# Patient Record
Sex: FEMALE | Race: WHITE | NOT HISPANIC OR LATINO | Employment: UNEMPLOYED | RURAL
[De-identification: names, ages, dates, MRNs, and addresses within clinical notes are randomized per-mention and may not be internally consistent; named-entity substitution may affect disease eponyms.]

---

## 2020-07-13 ENCOUNTER — HISTORICAL (OUTPATIENT)
Dept: ADMINISTRATIVE | Facility: HOSPITAL | Age: 60
End: 2020-07-13

## 2020-07-14 LAB — SARS-COV+SARS-COV-2 AG RESP QL IA.RAPID: NEGATIVE

## 2021-01-08 ENCOUNTER — HISTORICAL (OUTPATIENT)
Dept: ADMINISTRATIVE | Facility: HOSPITAL | Age: 61
End: 2021-01-08

## 2021-04-29 ENCOUNTER — OFFICE VISIT (OUTPATIENT)
Dept: FAMILY MEDICINE | Facility: CLINIC | Age: 61
End: 2021-04-29

## 2021-04-29 VITALS
BODY MASS INDEX: 22.5 KG/M2 | WEIGHT: 140 LBS | DIASTOLIC BLOOD PRESSURE: 76 MMHG | SYSTOLIC BLOOD PRESSURE: 120 MMHG | HEART RATE: 71 BPM | OXYGEN SATURATION: 97 % | RESPIRATION RATE: 20 BRPM | HEIGHT: 66 IN

## 2021-04-29 DIAGNOSIS — E78.5 HYPERLIPIDEMIA, UNSPECIFIED HYPERLIPIDEMIA TYPE: Primary | ICD-10-CM

## 2021-04-29 DIAGNOSIS — I10 HYPERTENSION, UNSPECIFIED TYPE: ICD-10-CM

## 2021-04-29 PROCEDURE — 99212 OFFICE O/P EST SF 10 MIN: CPT | Mod: ,,, | Performed by: FAMILY MEDICINE

## 2021-04-29 PROCEDURE — 99212 PR OFFICE/OUTPT VISIT, EST, LEVL II, 10-19 MIN: ICD-10-PCS | Mod: ,,, | Performed by: FAMILY MEDICINE

## 2021-04-29 RX ORDER — LOVASTATIN 20 MG/1
20 TABLET ORAL NIGHTLY
Qty: 90 TABLET | Refills: 3 | Status: SHIPPED | OUTPATIENT
Start: 2021-04-29 | End: 2022-05-13 | Stop reason: SDUPTHER

## 2021-04-29 RX ORDER — LISINOPRIL 10 MG/1
10 TABLET ORAL EVERY MORNING
COMMUNITY
Start: 2021-04-07 | End: 2021-04-29 | Stop reason: SDUPTHER

## 2021-04-29 RX ORDER — LISINOPRIL 10 MG/1
10 TABLET ORAL EVERY MORNING
Qty: 90 TABLET | Refills: 1 | Status: SHIPPED | OUTPATIENT
Start: 2021-04-29 | End: 2021-11-01 | Stop reason: SDUPTHER

## 2021-05-10 ENCOUNTER — TELEPHONE (OUTPATIENT)
Dept: FAMILY MEDICINE | Facility: CLINIC | Age: 61
End: 2021-05-10

## 2021-07-29 ENCOUNTER — OFFICE VISIT (OUTPATIENT)
Dept: FAMILY MEDICINE | Facility: CLINIC | Age: 61
End: 2021-07-29

## 2021-07-29 VITALS
RESPIRATION RATE: 18 BRPM | OXYGEN SATURATION: 97 % | SYSTOLIC BLOOD PRESSURE: 122 MMHG | HEART RATE: 71 BPM | WEIGHT: 140 LBS | TEMPERATURE: 98 F | HEIGHT: 66 IN | DIASTOLIC BLOOD PRESSURE: 78 MMHG | BODY MASS INDEX: 22.5 KG/M2

## 2021-07-29 DIAGNOSIS — E78.5 HYPERLIPIDEMIA, UNSPECIFIED HYPERLIPIDEMIA TYPE: ICD-10-CM

## 2021-07-29 DIAGNOSIS — Z12.31 SCREENING MAMMOGRAM FOR HIGH-RISK PATIENT: ICD-10-CM

## 2021-07-29 DIAGNOSIS — I10 HYPERTENSION, UNSPECIFIED TYPE: Primary | ICD-10-CM

## 2021-07-29 PROCEDURE — 99212 OFFICE O/P EST SF 10 MIN: CPT | Mod: ,,, | Performed by: FAMILY MEDICINE

## 2021-07-29 PROCEDURE — 99212 PR OFFICE/OUTPT VISIT, EST, LEVL II, 10-19 MIN: ICD-10-PCS | Mod: ,,, | Performed by: FAMILY MEDICINE

## 2021-11-01 DIAGNOSIS — I10 HYPERTENSION, UNSPECIFIED TYPE: ICD-10-CM

## 2021-11-01 RX ORDER — LISINOPRIL 10 MG/1
10 TABLET ORAL EVERY MORNING
Qty: 90 TABLET | Refills: 1 | Status: SHIPPED | OUTPATIENT
Start: 2021-11-01 | End: 2022-05-11

## 2021-11-08 ENCOUNTER — PATIENT MESSAGE (OUTPATIENT)
Dept: FAMILY MEDICINE | Facility: CLINIC | Age: 61
End: 2021-11-08

## 2021-11-11 ENCOUNTER — OFFICE VISIT (OUTPATIENT)
Dept: FAMILY MEDICINE | Facility: CLINIC | Age: 61
End: 2021-11-11

## 2021-11-11 VITALS
WEIGHT: 140 LBS | SYSTOLIC BLOOD PRESSURE: 123 MMHG | TEMPERATURE: 98 F | OXYGEN SATURATION: 95 % | HEIGHT: 66 IN | BODY MASS INDEX: 22.5 KG/M2 | RESPIRATION RATE: 20 BRPM | HEART RATE: 83 BPM | DIASTOLIC BLOOD PRESSURE: 87 MMHG

## 2021-11-11 DIAGNOSIS — R05.1 ACUTE COUGH: ICD-10-CM

## 2021-11-11 DIAGNOSIS — Z11.52 ENCOUNTER FOR SCREENING FOR COVID-19: Primary | ICD-10-CM

## 2021-11-11 DIAGNOSIS — J22 LOWER RESPIRATORY INFECTION: ICD-10-CM

## 2021-11-11 LAB
CTP QC/QA: YES
FLUAV AG NPH QL: NEGATIVE
FLUBV AG NPH QL: NEGATIVE
SARS-COV-2 AG RESP QL IA.RAPID: NEGATIVE
SARS-COV-2 RNA RESP QL NAA+PROBE: NEGATIVE

## 2021-11-11 PROCEDURE — 87428 POCT SARS-COV2 (COVID) WITH FLU ANTIGEN: ICD-10-PCS | Mod: QW,,, | Performed by: NURSE PRACTITIONER

## 2021-11-11 PROCEDURE — 87635 SARS-COV-2 (COVID-19) QUALITATIVE PCR: ICD-10-PCS | Mod: ,,, | Performed by: CLINICAL MEDICAL LABORATORY

## 2021-11-11 PROCEDURE — 99204 OFFICE O/P NEW MOD 45 MIN: CPT | Mod: 25,,, | Performed by: NURSE PRACTITIONER

## 2021-11-11 PROCEDURE — 87635 SARS-COV-2 COVID-19 AMP PRB: CPT | Mod: ,,, | Performed by: CLINICAL MEDICAL LABORATORY

## 2021-11-11 PROCEDURE — 96372 THER/PROPH/DIAG INJ SC/IM: CPT | Mod: ,,, | Performed by: NURSE PRACTITIONER

## 2021-11-11 PROCEDURE — 96372 PR INJECTION,THERAP/PROPH/DIAG2ST, IM OR SUBCUT: ICD-10-PCS | Mod: ,,, | Performed by: NURSE PRACTITIONER

## 2021-11-11 PROCEDURE — 99204 PR OFFICE/OUTPT VISIT, NEW, LEVL IV, 45-59 MIN: ICD-10-PCS | Mod: 25,,, | Performed by: NURSE PRACTITIONER

## 2021-11-11 PROCEDURE — 87428 SARSCOV & INF VIR A&B AG IA: CPT | Mod: QW,,, | Performed by: NURSE PRACTITIONER

## 2021-11-11 RX ORDER — ALBUTEROL SULFATE 90 UG/1
2 AEROSOL, METERED RESPIRATORY (INHALATION) EVERY 6 HOURS PRN
Qty: 18 G | Refills: 0 | Status: SHIPPED | OUTPATIENT
Start: 2021-11-11 | End: 2021-12-06

## 2021-11-11 RX ORDER — AZITHROMYCIN 500 MG/1
500 TABLET, FILM COATED ORAL DAILY
Qty: 5 TABLET | Refills: 0 | Status: SHIPPED | OUTPATIENT
Start: 2021-11-11 | End: 2021-11-16

## 2021-11-11 RX ORDER — DEXAMETHASONE SODIUM PHOSPHATE 4 MG/ML
8 INJECTION, SOLUTION INTRA-ARTICULAR; INTRALESIONAL; INTRAMUSCULAR; INTRAVENOUS; SOFT TISSUE ONCE
Status: COMPLETED | OUTPATIENT
Start: 2021-11-11 | End: 2021-11-11

## 2021-11-11 RX ORDER — CEFTRIAXONE 1 G/1
1 INJECTION, POWDER, FOR SOLUTION INTRAMUSCULAR; INTRAVENOUS
Status: COMPLETED | OUTPATIENT
Start: 2021-11-11 | End: 2021-11-11

## 2021-11-11 RX ORDER — DEXCHLORPHENIRAMINE MALEATE, DEXTROMETHORPHAN HBR, PHENYLEPHRINE HCL 1; 10; 5 MG/5ML; MG/5ML; MG/5ML
10 SYRUP ORAL EVERY 6 HOURS PRN
Qty: 120 ML | Refills: 1 | Status: SHIPPED | OUTPATIENT
Start: 2021-11-11 | End: 2022-02-16

## 2021-11-11 RX ADMIN — CEFTRIAXONE 1 G: 1 INJECTION, POWDER, FOR SOLUTION INTRAMUSCULAR; INTRAVENOUS at 10:11

## 2021-11-11 RX ADMIN — DEXAMETHASONE SODIUM PHOSPHATE 8 MG: 4 INJECTION, SOLUTION INTRA-ARTICULAR; INTRALESIONAL; INTRAMUSCULAR; INTRAVENOUS; SOFT TISSUE at 10:11

## 2022-02-16 ENCOUNTER — OFFICE VISIT (OUTPATIENT)
Dept: FAMILY MEDICINE | Facility: CLINIC | Age: 62
End: 2022-02-16

## 2022-02-16 VITALS
HEART RATE: 77 BPM | WEIGHT: 143 LBS | OXYGEN SATURATION: 99 % | SYSTOLIC BLOOD PRESSURE: 140 MMHG | DIASTOLIC BLOOD PRESSURE: 84 MMHG | RESPIRATION RATE: 18 BRPM | TEMPERATURE: 98 F | BODY MASS INDEX: 22.98 KG/M2 | HEIGHT: 66 IN

## 2022-02-16 DIAGNOSIS — Z12.11 SCREENING FOR COLON CANCER: ICD-10-CM

## 2022-02-16 DIAGNOSIS — R74.9 ABNORMAL LEVEL OF SERUM ENZYME: ICD-10-CM

## 2022-02-16 DIAGNOSIS — Z23 NEED FOR PROPHYLACTIC VACCINATION AND INOCULATION AGAINST INFLUENZA: Primary | ICD-10-CM

## 2022-02-16 PROCEDURE — 99212 PR OFFICE/OUTPT VISIT, EST, LEVL II, 10-19 MIN: ICD-10-PCS | Mod: 25,,, | Performed by: FAMILY MEDICINE

## 2022-02-16 PROCEDURE — 90471 IMMUNIZATION ADMIN: CPT | Mod: ,,, | Performed by: FAMILY MEDICINE

## 2022-02-16 PROCEDURE — 84075 ALKALINE PHOSPHATASE, ISOENZYMES: ICD-10-PCS | Mod: 90,,, | Performed by: CLINICAL MEDICAL LABORATORY

## 2022-02-16 PROCEDURE — 84075 ASSAY ALKALINE PHOSPHATASE: CPT | Mod: 90,,, | Performed by: CLINICAL MEDICAL LABORATORY

## 2022-02-16 PROCEDURE — 90686 IIV4 VACC NO PRSV 0.5 ML IM: CPT | Mod: ,,, | Performed by: FAMILY MEDICINE

## 2022-02-16 PROCEDURE — 90471 FLU VACCINE (QUAD) GREATER THAN OR EQUAL TO 3YO PRESERVATIVE FREE IM: ICD-10-PCS | Mod: ,,, | Performed by: FAMILY MEDICINE

## 2022-02-16 PROCEDURE — 84080 ASSAY ALKALINE PHOSPHATASES: CPT | Mod: 90,,, | Performed by: CLINICAL MEDICAL LABORATORY

## 2022-02-16 PROCEDURE — 99212 OFFICE O/P EST SF 10 MIN: CPT | Mod: 25,,, | Performed by: FAMILY MEDICINE

## 2022-02-16 PROCEDURE — 84080 ALKALINE PHOSPHATASE, ISOENZYMES: ICD-10-PCS | Mod: 90,,, | Performed by: CLINICAL MEDICAL LABORATORY

## 2022-02-16 PROCEDURE — 90686 FLU VACCINE (QUAD) GREATER THAN OR EQUAL TO 3YO PRESERVATIVE FREE IM: ICD-10-PCS | Mod: ,,, | Performed by: FAMILY MEDICINE

## 2022-02-16 NOTE — PROGRESS NOTES
Esperanza Wakefield is a 61 y.o. female seen today for follow-up on her hypertension and hyperlipidemia.  Patient's LDL was mildly elevated and we discussed improving her diet by decreasing intake of fried and fatty foods.  She also had a very mild elevation of her alkaline phosphatase and we will order fractionated alkaline phosphatase panel today.  She has had no chest pain or shortness of breath and is up-to-date on her immunizations.  Her mammogram is up-to-date.    History reviewed. No pertinent past medical history.  History reviewed. No pertinent family history.  Current Outpatient Medications on File Prior to Visit   Medication Sig Dispense Refill    albuterol (PROVENTIL/VENTOLIN HFA) 90 mcg/actuation inhaler INHALE 2 PUFFS INTO THE LUNGS EVERY 6 HOURS AS NEEDED FOR WHEEZING 18 g 0    lisinopriL 10 MG tablet Take 1 tablet (10 mg total) by mouth every morning. 90 tablet 1    lovastatin (MEVACOR) 20 MG tablet Take 1 tablet (20 mg total) by mouth every evening. 90 tablet 3    [DISCONTINUED] dexchlorphen-phenylephrine-DM (POLYTUSSIN DM) 1-5-10 mg/5 mL Syrp Take 10 mLs by mouth every 6 (six) hours as needed (cough). 120 mL 1     No current facility-administered medications on file prior to visit.     Immunization History   Administered Date(s) Administered    COVID-19, MRNA, LN-S, PF (Pfizer) (Purple Cap) 04/01/2021    COVID-19, vector-nr, rS-Ad26, PF (Nolberto) 04/12/2021       ROS     Vitals:    02/16/22 0836   BP: (!) 140/84   Pulse: 77   Resp: 18   Temp: 98.2 °F (36.8 °C)       Physical Exam     Assessment and Plan  Need for prophylactic vaccination and inoculation against influenza  -     Influenza - Quadrivalent *Preferred* (6 months+) (PF)    Abnormal level of serum enzyme  -     Alkaline Phosphatase, Isoenzymes; Future; Expected date: 02/16/2022    Screening for colon cancer  -     Cancel: Cologuard Screening (Multitarget Stool DNA); Future; Expected date: 02/16/2022  -     Cologuard Screening (Multitarget  Stool DNA); Future; Expected date: 02/23/2022            Return to clinic in 3 months for follow-up lab work or as needed once her blood work is in.    Health Maintenance Topics with due status: Not Due       Topic Last Completion Date    Lipid Panel 02/15/2022         Answers for HPI/ROS submitted by the patient on 2/8/2022  Chronicity: chronic  Onset: more than 1 year ago  Progression since onset: unchanged  Condition status: controlled  anxiety: No  peripheral edema: No  sweats: No  Agents associated with hypertension: no associated agents  CAD risks: family history  Compliance problems: no compliance problems  Past treatments: lifestyle changes  Improvement on treatment: significant

## 2022-02-21 LAB
ALP BONE CFR SERPL: 32.3 % (ref 19.1–67.7)
ALP BONE SERPL-CCNC: 39.1 IU/L (ref 12.1–42.7)
ALP INTEST CFR SERPL: 0 % (ref 0–20.6)
ALP INTEST SERPL-CCNC: 0 IU/L (ref 0–11)
ALP LIVER 1 CFR SERPL: 65.1 % (ref 27.8–76.3)
ALP LIVER 1 SERPL-CCNC: 78.8 IU/L (ref 16.2–70.2)
ALP LIVER 2 CFR SERPL: 2.6 % (ref 0–8)
ALP LIVER 2 SERPL-CCNC: 3.1 IU/L (ref 0–5.8)
ALP PLAC SERPL QL: ABNORMAL
ALP SERPL-CCNC: 121 U/L (ref 35–104)

## 2022-03-02 ENCOUNTER — TELEPHONE (OUTPATIENT)
Dept: FAMILY MEDICINE | Facility: CLINIC | Age: 62
End: 2022-03-02

## 2022-03-02 DIAGNOSIS — R74.8 ELEVATED LIVER ENZYMES: Primary | ICD-10-CM

## 2022-03-02 NOTE — TELEPHONE ENCOUNTER
Pt notified and verbalized understanding. Liver US in for scheduling. Pt reports she has not completed cologuard at this time.

## 2022-05-13 ENCOUNTER — OFFICE VISIT (OUTPATIENT)
Dept: FAMILY MEDICINE | Facility: CLINIC | Age: 62
End: 2022-05-13

## 2022-05-13 VITALS
DIASTOLIC BLOOD PRESSURE: 72 MMHG | BODY MASS INDEX: 22.18 KG/M2 | OXYGEN SATURATION: 95 % | TEMPERATURE: 98 F | HEART RATE: 70 BPM | HEIGHT: 66 IN | WEIGHT: 138 LBS | SYSTOLIC BLOOD PRESSURE: 134 MMHG | RESPIRATION RATE: 18 BRPM

## 2022-05-13 DIAGNOSIS — E78.49 OTHER HYPERLIPIDEMIA: ICD-10-CM

## 2022-05-13 DIAGNOSIS — Z23 NEED FOR TDAP VACCINATION: ICD-10-CM

## 2022-05-13 DIAGNOSIS — I10 ESSENTIAL HYPERTENSION, BENIGN: Primary | ICD-10-CM

## 2022-05-13 PROCEDURE — 90715 TDAP VACCINE GREATER THAN OR EQUAL TO 7YO IM: ICD-10-PCS | Mod: ,,, | Performed by: NURSE PRACTITIONER

## 2022-05-13 PROCEDURE — 90715 TDAP VACCINE 7 YRS/> IM: CPT | Mod: ,,, | Performed by: NURSE PRACTITIONER

## 2022-05-13 PROCEDURE — 90471 TDAP VACCINE GREATER THAN OR EQUAL TO 7YO IM: ICD-10-PCS | Mod: ,,, | Performed by: NURSE PRACTITIONER

## 2022-05-13 PROCEDURE — 99214 PR OFFICE/OUTPT VISIT, EST, LEVL IV, 30-39 MIN: ICD-10-PCS | Mod: 25,,, | Performed by: NURSE PRACTITIONER

## 2022-05-13 PROCEDURE — 90471 IMMUNIZATION ADMIN: CPT | Mod: ,,, | Performed by: NURSE PRACTITIONER

## 2022-05-13 PROCEDURE — 99214 OFFICE O/P EST MOD 30 MIN: CPT | Mod: 25,,, | Performed by: NURSE PRACTITIONER

## 2022-05-13 RX ORDER — LOVASTATIN 20 MG/1
20 TABLET ORAL NIGHTLY
Qty: 90 TABLET | Refills: 1 | Status: SHIPPED | OUTPATIENT
Start: 2022-05-13 | End: 2022-12-07 | Stop reason: SDUPTHER

## 2022-05-13 RX ORDER — LISINOPRIL 10 MG/1
10 TABLET ORAL EVERY MORNING
Qty: 90 TABLET | Refills: 1 | Status: SHIPPED | OUTPATIENT
Start: 2022-05-13 | End: 2022-11-30 | Stop reason: SDUPTHER

## 2022-05-13 NOTE — PROGRESS NOTES
[facility] - FAMILY MEDICINE         PATIENT NAME: Esperanza Wakefield   : 1960    AGE: 61 y.o. DATE: 2022    MRN: [id number]        Reason for Visit / Chief Complaint:  Follow-up (Routine 3 month and lab review, reports she is nonfasting ) and Medication Refill     Subjective:     HPI:  61 yr old WF presents to the office for 3m f/u  Denies any complaints at this time  Reports takes medication as prescribed  Reviewed lab results from lab drawn yesterday - labs WNL     Review of Systems:    Pertinent items are above noted in HPI.  A comprehensive review of systems was negative  Review of patient's allergies indicates:  No Known Allergies     Med List:  Current Outpatient Medications on File Prior to Visit   Medication Sig Dispense Refill    albuterol (PROVENTIL/VENTOLIN HFA) 90 mcg/actuation inhaler INHALE 2 PUFFS INTO THE LUNGS EVERY 6 HOURS AS NEEDED FOR WHEEZING 18 g 0    lisinopriL 10 MG tablet TAKE 1 TABLET(10 MG) BY MOUTH EVERY MORNING 90 tablet 1    lovastatin (MEVACOR) 20 MG tablet Take 1 tablet (20 mg total) by mouth every evening. 90 tablet 3     No current facility-administered medications on file prior to visit.       Medical/Social/Family History:  Past Medical History:   Diagnosis Date    Hyperlipidemia     Hypertension     SOB (shortness of breath)     Wheezing       Social History     Tobacco Use   Smoking Status Current Every Day Smoker    Packs/day: 0.50    Years: 40.00    Pack years: 20.00    Types: Cigarettes   Smokeless Tobacco Never Used      Social History     Substance and Sexual Activity   Alcohol Use Not Currently       Family History   Problem Relation Age of Onset    Lung cancer Mother     Hypertension Mother     Hypertension Father     Diabetes Father     Hypertension Sister     Diabetes Sister     Hypertension Brother     Diabetes Brother       Past Surgical History:   Procedure Laterality Date    HERNIA REPAIR      SINUS SURGERY  "     TUBAL LIGATION      TUMOR REMOVAL          Objective:      Vitals:    05/13/22 0818   BP: 134/72   BP Location: Left arm   Patient Position: Sitting   BP Method: Large (Manual)   Pulse: 70   Resp: 18   Temp: 97.8 °F (36.6 °C)   TempSrc: Oral   SpO2: 95%   Weight: 62.6 kg (138 lb)   Height: 5' 6" (1.676 m)     Body mass index is 22.27 kg/m².     Physical Exam:    General: well developed, well nourished    Head: normocephalic, atraumatic    Respiratory: unlabored respirations, no intercostal retractions or accessory muscle use, clear to auscultation without rales or wheezes    Cardiovascular: normal rate and regular rhythm, S1 and S2 normal, no murmurs noted    Abdomen: soft, nontender    Musculoskeletal: negative; full range of motion, no tenderness, palpable spasm or pain on motion    Neurological: normal without focal findings and mental status, speech normal, alert and oriented x3    Skin: Skin color, texture, turgor normal. No rashes or lesions    Psych: no auditory or visual hallucinations, no anxiety, no depression/worrying alot       Assessment:          ICD-10-CM ICD-9-CM   1. Need for Tdap vaccination  Z23 V06.1   2. Hyperlipidemia, unspecified hyperlipidemia type  E78.5 272.4   3. Hypertension, unspecified type  I10 401.9        Plan:       Need for Tdap vaccination    Hyperlipidemia, unspecified hyperlipidemia type    Hypertension, unspecified type        Current Outpatient Medications:     albuterol (PROVENTIL/VENTOLIN HFA) 90 mcg/actuation inhaler, INHALE 2 PUFFS INTO THE LUNGS EVERY 6 HOURS AS NEEDED FOR WHEEZING, Disp: 18 g, Rfl: 0    lisinopriL 10 MG tablet, TAKE 1 TABLET(10 MG) BY MOUTH EVERY MORNING, Disp: 90 tablet, Rfl: 1    lovastatin (MEVACOR) 20 MG tablet, Take 1 tablet (20 mg total) by mouth every evening., Disp: 90 tablet, Rfl: 3      New & refilled meds:  Requested Prescriptions     Pending Prescriptions Disp Refills    lovastatin (MEVACOR) 20 MG tablet 90 tablet 3     Sig: Take 1 " tablet (20 mg total) by mouth every evening.    lisinopriL 10 MG tablet 90 tablet 1     Sig: Take 1 tablet (10 mg total) by mouth every morning.     Treatment Recommendations:    Orders and follow up as documented in patient record.    Return to clinic in 6 months for htn, hld and as needed.    Signature: CHELSEY Mcmillan

## 2022-09-26 ENCOUNTER — TELEPHONE (OUTPATIENT)
Dept: FAMILY MEDICINE | Facility: CLINIC | Age: 62
End: 2022-09-26

## 2022-11-14 ENCOUNTER — TELEPHONE (OUTPATIENT)
Dept: FAMILY MEDICINE | Facility: CLINIC | Age: 62
End: 2022-11-14

## 2022-11-14 NOTE — TELEPHONE ENCOUNTER
Patient sent request through portal for refills, Estelle refused. I called the patient and notified her Dang Kingsley is no longer here and she would need to establish care for refills. Patient stated she would look at her calendar and get back with us on appointment.

## 2022-11-30 DIAGNOSIS — I10 ESSENTIAL HYPERTENSION, BENIGN: ICD-10-CM

## 2022-11-30 RX ORDER — LISINOPRIL 10 MG/1
10 TABLET ORAL EVERY MORNING
Qty: 30 TABLET | Refills: 0 | Status: SHIPPED | OUTPATIENT
Start: 2022-11-30 | End: 2022-12-07 | Stop reason: SDUPTHER

## 2022-12-07 ENCOUNTER — OFFICE VISIT (OUTPATIENT)
Dept: FAMILY MEDICINE | Facility: CLINIC | Age: 62
End: 2022-12-07

## 2022-12-07 VITALS
HEIGHT: 66 IN | HEART RATE: 76 BPM | DIASTOLIC BLOOD PRESSURE: 78 MMHG | BODY MASS INDEX: 22.18 KG/M2 | SYSTOLIC BLOOD PRESSURE: 135 MMHG | OXYGEN SATURATION: 99 % | WEIGHT: 138 LBS | TEMPERATURE: 98 F | RESPIRATION RATE: 18 BRPM

## 2022-12-07 DIAGNOSIS — I10 ESSENTIAL HYPERTENSION, BENIGN: ICD-10-CM

## 2022-12-07 DIAGNOSIS — E78.49 OTHER HYPERLIPIDEMIA: ICD-10-CM

## 2022-12-07 PROCEDURE — 99214 PR OFFICE/OUTPT VISIT, EST, LEVL IV, 30-39 MIN: ICD-10-PCS | Mod: ,,, | Performed by: FAMILY MEDICINE

## 2022-12-07 PROCEDURE — 99214 OFFICE O/P EST MOD 30 MIN: CPT | Mod: ,,, | Performed by: FAMILY MEDICINE

## 2022-12-07 RX ORDER — LOVASTATIN 20 MG/1
20 TABLET ORAL NIGHTLY
Qty: 90 TABLET | Refills: 1 | Status: SHIPPED | OUTPATIENT
Start: 2022-12-07 | End: 2022-12-19 | Stop reason: SDUPTHER

## 2022-12-07 RX ORDER — LISINOPRIL 10 MG/1
10 TABLET ORAL EVERY MORNING
Qty: 90 TABLET | Refills: 1 | Status: SHIPPED | OUTPATIENT
Start: 2022-12-07 | End: 2023-06-07 | Stop reason: SDUPTHER

## 2022-12-07 NOTE — PROGRESS NOTES
Esperanza Wakefield is a 62 y.o. female seen today for follow-up on her hyperlipidemia and hypertension.  Her blood pressures have been well controlled but her lipids are still not to goal with an LDL cholesterol of 85.  She admits to not following a low-fat diet as well as not taking her cholesterol medication nightly.  She plans on taking her medication as prescribed and decreasing her intake of fatty foods.  We will plan on rechecking lipids in approximately 6 months.  For now patient did defer the flu vaccination and colon cancer screening due to lack of insurance.  Patient may get her flu vaccination for free at a local pharmacy.      Past Medical History:   Diagnosis Date    Hyperlipidemia     Hypertension     SOB (shortness of breath)     Wheezing      Family History   Problem Relation Age of Onset    Lung cancer Mother     Hypertension Mother     Hypertension Father     Diabetes Father     Hypertension Sister     Diabetes Sister     Hypertension Brother     Diabetes Brother      Current Outpatient Medications on File Prior to Visit   Medication Sig Dispense Refill    albuterol (PROVENTIL/VENTOLIN HFA) 90 mcg/actuation inhaler INHALE 2 PUFFS INTO THE LUNGS EVERY 6 HOURS AS NEEDED FOR WHEEZING 18 g 0    [DISCONTINUED] lisinopriL 10 MG tablet Take 1 tablet (10 mg total) by mouth every morning. 30 tablet 0    [DISCONTINUED] lovastatin (MEVACOR) 20 MG tablet Take 1 tablet (20 mg total) by mouth every evening. 90 tablet 1     No current facility-administered medications on file prior to visit.     Immunization History   Administered Date(s) Administered    COVID-19, MRNA, LN-S, PF (MODERNA FULL 0.5 ML DOSE) 04/01/2021    COVID-19, MRNA, LN-S, PF (Pfizer) (Purple Cap) 04/01/2021    COVID-19, vector-nr, rS-Ad26, PF (Nolberto) 04/12/2021    Influenza - Quadrivalent - PF *Preferred* (6 months and older) 02/16/2022    Tdap 05/13/2022       Review of Systems   Constitutional:  Negative for fever, malaise/fatigue and weight loss.    Respiratory:  Negative for shortness of breath.    Cardiovascular:  Negative for chest pain and palpitations.   Gastrointestinal:  Negative for nausea and vomiting.   Psychiatric/Behavioral:  Negative for depression.       Vitals:    12/07/22 1113   BP: 135/78   Pulse: 76   Resp: 18   Temp: 97.5 °F (36.4 °C)       Physical Exam  Vitals reviewed.   Constitutional:       Appearance: Normal appearance.   HENT:      Head: Normocephalic.   Eyes:      Extraocular Movements: Extraocular movements intact.      Conjunctiva/sclera: Conjunctivae normal.      Pupils: Pupils are equal, round, and reactive to light.   Neck:      Thyroid: No thyroid mass or thyromegaly.   Cardiovascular:      Rate and Rhythm: Normal rate and regular rhythm.      Heart sounds: Normal heart sounds. No murmur heard.    No gallop.   Pulmonary:      Effort: Pulmonary effort is normal. No respiratory distress.      Breath sounds: Normal breath sounds. No wheezing or rales.   Skin:     General: Skin is warm and dry.      Coloration: Skin is not jaundiced or pale.   Neurological:      Mental Status: She is alert.   Psychiatric:         Mood and Affect: Mood normal.         Behavior: Behavior normal.         Thought Content: Thought content normal.         Judgment: Judgment normal.        Assessment and Plan  Essential hypertension, benign  -     lisinopriL 10 MG tablet; Take 1 tablet (10 mg total) by mouth every morning.  Dispense: 90 tablet; Refill: 1  -     CBC Auto Differential; Future; Expected date: 06/07/2023  -     Comprehensive Metabolic Panel; Future; Expected date: 06/07/2023    Other hyperlipidemia  -     lovastatin (MEVACOR) 20 MG tablet; Take 1 tablet (20 mg total) by mouth every evening.  Dispense: 90 tablet; Refill: 1  -     Lipid Panel; Future; Expected date: 06/07/2023            Return to clinic in 6 months for follow-up lab work and an office visit or as needed.    Health Maintenance Topics with due status: Not Due       Topic Last  Completion Date    TETANUS VACCINE 05/13/2022    Lipid Panel 12/06/2022

## 2022-12-19 DIAGNOSIS — E78.49 OTHER HYPERLIPIDEMIA: ICD-10-CM

## 2022-12-19 RX ORDER — LOVASTATIN 20 MG/1
20 TABLET ORAL NIGHTLY
Qty: 90 TABLET | Refills: 1 | Status: SHIPPED | OUTPATIENT
Start: 2022-12-19 | End: 2023-06-07

## 2022-12-19 NOTE — TELEPHONE ENCOUNTER
----- Message from Chelsea Spencer MA sent at 12/19/2022 10:46 AM CST -----  Please send in lovastatin to [facility] on [address] for pt.  She says this was sent in for her but when she went to pick it up they had canceled it and she needs it sent back in.

## 2023-06-07 ENCOUNTER — OFFICE VISIT (OUTPATIENT)
Dept: FAMILY MEDICINE | Facility: CLINIC | Age: 63
End: 2023-06-07

## 2023-06-07 VITALS
SYSTOLIC BLOOD PRESSURE: 118 MMHG | WEIGHT: 140 LBS | HEART RATE: 82 BPM | HEIGHT: 66 IN | TEMPERATURE: 98 F | OXYGEN SATURATION: 99 % | RESPIRATION RATE: 19 BRPM | DIASTOLIC BLOOD PRESSURE: 70 MMHG | BODY MASS INDEX: 22.5 KG/M2

## 2023-06-07 DIAGNOSIS — I10 ESSENTIAL HYPERTENSION, BENIGN: ICD-10-CM

## 2023-06-07 DIAGNOSIS — E78.49 OTHER HYPERLIPIDEMIA: ICD-10-CM

## 2023-06-07 PROCEDURE — 99214 OFFICE O/P EST MOD 30 MIN: CPT | Mod: ,,, | Performed by: FAMILY MEDICINE

## 2023-06-07 PROCEDURE — 99214 PR OFFICE/OUTPT VISIT, EST, LEVL IV, 30-39 MIN: ICD-10-PCS | Mod: ,,, | Performed by: FAMILY MEDICINE

## 2023-06-07 RX ORDER — LOVASTATIN 40 MG/1
40 TABLET ORAL NIGHTLY
Qty: 90 TABLET | Refills: 2 | Status: SHIPPED | OUTPATIENT
Start: 2023-06-07 | End: 2023-12-07 | Stop reason: SDUPTHER

## 2023-06-07 RX ORDER — LOVASTATIN 20 MG/1
20 TABLET ORAL NIGHTLY
Qty: 90 TABLET | Refills: 1 | Status: CANCELLED | OUTPATIENT
Start: 2023-06-07 | End: 2023-12-04

## 2023-06-07 RX ORDER — LISINOPRIL 10 MG/1
10 TABLET ORAL EVERY MORNING
Qty: 90 TABLET | Refills: 2 | Status: SHIPPED | OUTPATIENT
Start: 2023-06-07 | End: 2023-12-07 | Stop reason: ALTCHOICE

## 2023-06-14 DIAGNOSIS — J22 LOWER RESPIRATORY INFECTION: ICD-10-CM

## 2023-06-14 RX ORDER — ALBUTEROL SULFATE 90 UG/1
2 AEROSOL, METERED RESPIRATORY (INHALATION) EVERY 6 HOURS PRN
Qty: 18 G | Refills: 3 | Status: SHIPPED | OUTPATIENT
Start: 2023-06-14 | End: 2023-12-07 | Stop reason: SDUPTHER

## 2023-06-26 ENCOUNTER — PATIENT OUTREACH (OUTPATIENT)
Dept: ADMINISTRATIVE | Facility: HOSPITAL | Age: 63
End: 2023-06-26

## 2023-06-26 NOTE — PROGRESS NOTES
Health maintenance record review for population health care gaps    06/26/2023   --Chart accessed for:cervical cancer screen  --Care Gaps addressed:cervical cancer screen  Outreach made to patient via na . (Success) (Left Message) (Unavailable)   Patient stated na  Care Everywhere updates requested and reviewed.  Media reports reviewed.  [facility] and [facility] reviewed.  Immunization Database ([employer]/MIXX) reviewed. Vaccinations uploaded:   updated with external [facility], , [facility], [facility] EVERYWHERE   Report  Requested na records from na  Next appointment na . Appointment notes updated to include: na  Health Maintenance Due   Topic Date Due    Hepatitis C Screening  Never done    Cervical Cancer Screening  Never done    Pneumococcal Vaccines (Age 0-64) (1 - PCV) Never done    Mammogram  Never done    Colorectal Cancer Screening  Never done    LDCT Lung Screen  Never done    Shingles Vaccine (1 of 2) Never done

## 2023-07-20 ENCOUNTER — PATIENT MESSAGE (OUTPATIENT)
Dept: ADMINISTRATIVE | Facility: HOSPITAL | Age: 63
End: 2023-07-20

## 2023-12-07 ENCOUNTER — OFFICE VISIT (OUTPATIENT)
Dept: FAMILY MEDICINE | Facility: CLINIC | Age: 63
End: 2023-12-07

## 2023-12-07 VITALS
HEART RATE: 70 BPM | BODY MASS INDEX: 22.85 KG/M2 | RESPIRATION RATE: 18 BRPM | DIASTOLIC BLOOD PRESSURE: 62 MMHG | TEMPERATURE: 98 F | OXYGEN SATURATION: 96 % | HEIGHT: 66 IN | WEIGHT: 142.19 LBS | SYSTOLIC BLOOD PRESSURE: 110 MMHG

## 2023-12-07 DIAGNOSIS — Z23 NEED FOR IMMUNIZATION AGAINST INFLUENZA: ICD-10-CM

## 2023-12-07 DIAGNOSIS — E78.49 OTHER HYPERLIPIDEMIA: Primary | ICD-10-CM

## 2023-12-07 DIAGNOSIS — R05.3 CHRONIC COUGH: ICD-10-CM

## 2023-12-07 PROCEDURE — 90471 IMMUNIZATION ADMIN: CPT | Mod: ,,, | Performed by: FAMILY MEDICINE

## 2023-12-07 PROCEDURE — 90686 IIV4 VACC NO PRSV 0.5 ML IM: CPT | Mod: ,,, | Performed by: FAMILY MEDICINE

## 2023-12-07 PROCEDURE — 90686 FLU VACCINE (QUAD) GREATER THAN OR EQUAL TO 3YO PRESERVATIVE FREE IM: ICD-10-PCS | Mod: ,,, | Performed by: FAMILY MEDICINE

## 2023-12-07 PROCEDURE — 99213 OFFICE O/P EST LOW 20 MIN: CPT | Mod: 25,,, | Performed by: FAMILY MEDICINE

## 2023-12-07 PROCEDURE — 99213 PR OFFICE/OUTPT VISIT, EST, LEVL III, 20-29 MIN: ICD-10-PCS | Mod: 25,,, | Performed by: FAMILY MEDICINE

## 2023-12-07 PROCEDURE — 90471 FLU VACCINE (QUAD) GREATER THAN OR EQUAL TO 3YO PRESERVATIVE FREE IM: ICD-10-PCS | Mod: ,,, | Performed by: FAMILY MEDICINE

## 2023-12-07 RX ORDER — LISINOPRIL 10 MG/1
10 TABLET ORAL EVERY MORNING
Qty: 90 TABLET | Refills: 2 | Status: CANCELLED | OUTPATIENT
Start: 2023-12-07

## 2023-12-07 RX ORDER — LOVASTATIN 40 MG/1
40 TABLET ORAL NIGHTLY
Qty: 90 TABLET | Refills: 2 | Status: SHIPPED | OUTPATIENT
Start: 2023-12-07 | End: 2024-12-06

## 2023-12-07 RX ORDER — ALBUTEROL SULFATE 90 UG/1
2 AEROSOL, METERED RESPIRATORY (INHALATION) EVERY 6 HOURS PRN
Qty: 18 G | Refills: 3 | Status: SHIPPED | OUTPATIENT
Start: 2023-12-07

## 2023-12-07 RX ORDER — ALBUTEROL SULFATE 90 UG/1
2 AEROSOL, METERED RESPIRATORY (INHALATION) EVERY 6 HOURS PRN
Qty: 18 G | Refills: 3 | Status: SHIPPED | OUTPATIENT
Start: 2023-12-07 | End: 2023-12-07 | Stop reason: CLARIF

## 2023-12-07 NOTE — PROGRESS NOTES
Esperanza Wakefield is a 63 y.o. female seen today for follow-up on her blood pressure and hyperlipidemia.  Patient's lipids were to goal and her blood pressures have been on the low side lately.  We discussed holding her lisinopril and following up with her blood pressure log for a nurse visit in approximately 1 month.  Patient denies any chest pain or shortness a breath and is active in meeting her ADLs.  Patient defers a mammogram for now due to cost.      Past Medical History:   Diagnosis Date    Hyperlipidemia     Hypertension     SOB (shortness of breath)     Wheezing      Family History   Problem Relation Age of Onset    Lung cancer Mother     Hypertension Mother     Hypertension Father     Diabetes Father     Hypertension Sister     Diabetes Sister     Hypertension Brother     Diabetes Brother      Current Outpatient Medications on File Prior to Visit   Medication Sig Dispense Refill    [DISCONTINUED] albuterol (PROVENTIL/VENTOLIN HFA) 90 mcg/actuation inhaler Inhale 2 puffs into the lungs every 6 (six) hours as needed for Wheezing. Rescue 18 g 3    [DISCONTINUED] lisinopriL 10 MG tablet Take 1 tablet (10 mg total) by mouth every morning. 90 tablet 2    [DISCONTINUED] lovastatin (MEVACOR) 40 MG tablet Take 1 tablet (40 mg total) by mouth every evening. 90 tablet 2     No current facility-administered medications on file prior to visit.     Immunization History   Administered Date(s) Administered    COVID-19, MRNA, LN-S, PF (MODERNA FULL 0.5 ML DOSE) 04/01/2021    COVID-19, MRNA, LN-S, PF (Pfizer) (Purple Cap) 04/01/2021    COVID-19, vector-nr, rS-Ad26, PF (Ibotta) 04/12/2021    Influenza - Quadrivalent - PF *Preferred* (6 months and older) 02/16/2022    Tdap 05/13/2022       Review of Systems   Constitutional:  Negative for fever, malaise/fatigue and weight loss.   Respiratory:  Negative for shortness of breath.    Cardiovascular:  Negative for chest pain and palpitations.   Gastrointestinal:  Negative for nausea  and vomiting.   Neurological:  Positive for dizziness.   Psychiatric/Behavioral:  Negative for depression.         Vitals:    12/07/23 0831   BP: 110/62   Pulse:    Resp:    Temp:        Physical Exam  Vitals reviewed.   Constitutional:       Appearance: Normal appearance.   HENT:      Head: Normocephalic.   Eyes:      Extraocular Movements: Extraocular movements intact.      Conjunctiva/sclera: Conjunctivae normal.      Pupils: Pupils are equal, round, and reactive to light.   Neck:      Thyroid: No thyroid mass or thyromegaly.   Cardiovascular:      Rate and Rhythm: Normal rate and regular rhythm.      Heart sounds: Normal heart sounds. No murmur heard.     No gallop.   Pulmonary:      Effort: Pulmonary effort is normal. No respiratory distress.      Breath sounds: Normal breath sounds. No wheezing or rales.   Skin:     General: Skin is warm and dry.      Coloration: Skin is not jaundiced or pale.   Neurological:      Mental Status: She is alert.   Psychiatric:         Mood and Affect: Mood normal.         Behavior: Behavior normal.         Thought Content: Thought content normal.         Judgment: Judgment normal.          Assessment and Plan  1. Other hyperlipidemia  -     lovastatin (MEVACOR) 40 MG tablet; Take 1 tablet (40 mg total) by mouth every evening.  Dispense: 90 tablet; Refill: 2    2. Need for immunization against influenza  -     Influenza - Quadrivalent *Preferred* (6 months+) (PF)    3. Chronic cough  -     albuterol (PROVENTIL/VENTOLIN HFA) 90 mcg/actuation inhaler; Inhale 2 puffs into the lungs every 6 (six) hours as needed for Wheezing. Rescue  Dispense: 18 g; Refill: 3    Other orders  -     Discontinue: albuterol (PROVENTIL/VENTOLIN HFA) 90 mcg/actuation inhaler; Inhale 2 puffs into the lungs every 6 (six) hours as needed for Wheezing. Rescue  Dispense: 18 g; Refill: 3             Return to clinic in 1 month for a nurse visit with blood pressure log.    Health Maintenance Topics with due  status: Not Due       Topic Last Completion Date    TETANUS VACCINE 05/13/2022    Lipid Panel 12/06/2023

## 2023-12-30 ENCOUNTER — HOSPITAL ENCOUNTER (EMERGENCY)
Facility: HOSPITAL | Age: 63
Discharge: HOME | End: 2023-12-30

## 2023-12-30 VITALS
SYSTOLIC BLOOD PRESSURE: 146 MMHG | OXYGEN SATURATION: 95 % | RESPIRATION RATE: 18 BRPM | DIASTOLIC BLOOD PRESSURE: 64 MMHG | TEMPERATURE: 98 F | BODY MASS INDEX: 23.3 KG/M2 | WEIGHT: 145 LBS | HEART RATE: 84 BPM | HEIGHT: 66 IN

## 2023-12-30 DIAGNOSIS — S42.202S CLOSED FRACTURE OF PROXIMAL END OF LEFT HUMERUS, UNSPECIFIED FRACTURE MORPHOLOGY, SEQUELA: Primary | ICD-10-CM

## 2023-12-30 PROCEDURE — 63600175 PHARM REV CODE 636 W HCPCS: Performed by: REGISTERED NURSE

## 2023-12-30 PROCEDURE — 96372 THER/PROPH/DIAG INJ SC/IM: CPT | Performed by: REGISTERED NURSE

## 2023-12-30 PROCEDURE — 99284 EMERGENCY DEPT VISIT MOD MDM: CPT | Mod: ,,, | Performed by: REGISTERED NURSE

## 2023-12-30 PROCEDURE — 99284 EMERGENCY DEPT VISIT MOD MDM: CPT

## 2023-12-30 RX ORDER — HYDROCODONE BITARTRATE AND ACETAMINOPHEN 7.5; 325 MG/1; MG/1
1 TABLET ORAL EVERY 8 HOURS PRN
Qty: 12 TABLET | Refills: 0 | Status: SHIPPED | OUTPATIENT
Start: 2023-12-30

## 2023-12-30 RX ORDER — MORPHINE SULFATE 4 MG/ML
4 INJECTION, SOLUTION INTRAMUSCULAR; INTRAVENOUS
Status: COMPLETED | OUTPATIENT
Start: 2023-12-30 | End: 2023-12-30

## 2023-12-30 RX ORDER — MORPHINE SULFATE 4 MG/ML
4 INJECTION, SOLUTION INTRAMUSCULAR; INTRAVENOUS
Status: DISCONTINUED | OUTPATIENT
Start: 2023-12-30 | End: 2023-12-30

## 2023-12-30 RX ADMIN — MORPHINE SULFATE 4 MG: 4 INJECTION, SOLUTION INTRAMUSCULAR; INTRAVENOUS at 12:12

## 2023-12-30 NOTE — ED PROVIDER NOTES
Encounter Date: 12/30/2023       History     Chief Complaint   Patient presents with    Shoulder Injury     63-year-old female presenting to the emergency room today complaining of left arm pain after falling.  Reports walking down steps and tripped and fell landing on her left side.  Left arm currently guarded with decreased range of motion tenderness to palpation.  No reported loss of consciousness, denies striking her head, no other interval      Review of patient's allergies indicates:  No Known Allergies  Past Medical History:   Diagnosis Date    Hyperlipidemia     Hypertension     SOB (shortness of breath)     Wheezing      Past Surgical History:   Procedure Laterality Date    HERNIA REPAIR      SINUS SURGERY      TUBAL LIGATION      TUMOR REMOVAL       Family History   Problem Relation Age of Onset    Lung cancer Mother     Hypertension Mother     Hypertension Father     Diabetes Father     Hypertension Sister     Diabetes Sister     Hypertension Brother     Diabetes Brother      Social History     Tobacco Use    Smoking status: Every Day     Current packs/day: 0.50     Average packs/day: 0.5 packs/day for 40.0 years (20.0 ttl pk-yrs)     Types: Cigarettes     Passive exposure: Current    Smokeless tobacco: Never   Substance Use Topics    Alcohol use: Not Currently    Drug use: Never     Review of Systems   Musculoskeletal:         Left arm pain proximal humerus   Neurological:  Negative for dizziness, syncope, light-headedness and headaches.   All other systems reviewed and are negative.      Physical Exam     Initial Vitals [12/30/23 1025]   BP Pulse Resp Temp SpO2   (!) 146/64 84 17 98.1 °F (36.7 °C) 95 %      MAP       --         Physical Exam    Vitals reviewed.  Constitutional: She appears well-developed and well-nourished.   HENT:   Head: Normocephalic and atraumatic.   Eyes: EOM are normal.   Neck:   Normal range of motion.  Cardiovascular:  Normal rate and intact distal pulses.           No murmur  heard.  Pulmonary/Chest: Breath sounds normal. No respiratory distress.   Abdominal: Abdomen is soft.   Musculoskeletal:         General: Tenderness present.      Cervical back: Normal range of motion.      Comments: Tenderness right humerus with guarding of external     Neurological: She is alert and oriented to person, place, and time. GCS score is 15. GCS eye subscore is 4. GCS verbal subscore is 5. GCS motor subscore is 6.   Skin: Skin is warm and dry. Capillary refill takes less than 2 seconds.   Psychiatric: She has a normal mood and affect.         Medical Screening Exam   See Full Note    ED Course   Procedures  Labs Reviewed - No data to display       Imaging Results              X-Ray Shoulder 2 or More Views Left (Final result)  Result time 12/30/23 10:41:58      Final result by Marcell Vargas MD (12/30/23 10:41:58)                   Impression:      Fracture involving the greater tuberosity of the humerus and extending into the humeral neck      Electronically signed by: Marcell Vargas  Date:    12/30/2023  Time:    10:41               Narrative:    EXAMINATION:  XR SHOULDER COMPLETE 2 OR MORE VIEWS LEFT    CLINICAL HISTORY:  shoulder injury;    TECHNIQUE:  Two or three views of the left shoulder were performed.    COMPARISON:  None    FINDINGS:  There is an avulsion fracture along the greater tuberosity with mild displacement.  This appears to extend inferiorly into the humeral neck as best can be appreciated on this radiograph.  No other fracture detected.                                       Medications   morphine injection 4 mg (4 mg Intramuscular Given 12/30/23 1214)     Medical Decision Making  Amount and/or Complexity of Data Reviewed  Radiology: ordered. Decision-making details documented in ED Course.    Risk  Prescription drug management.               ED Course as of 12/30/23 1747   Sat Dec 30, 2023   1129 X-Ray Shoulder 2 or More Views Left [SF]      ED Course User Index  [SF] Yasir Prince  MARBIN                           Clinical Impression:   Final diagnoses:  [S42.202S] Closed fracture of proximal end of left humerus, unspecified fracture morphology, sequela (Primary)        ED Disposition Condition    Discharge Stable          ED Prescriptions       Medication Sig Dispense Start Date End Date Auth. Provider    HYDROcodone-acetaminophen (NORCO) 7.5-325 mg per tablet Take 1 tablet by mouth every 8 (eight) hours as needed for Pain. 12 tablet 12/30/2023 -- Yasir Prince ACNP          Follow-up Information    None          Yasir Prince ACNP  12/30/23 9450

## 2024-04-19 DIAGNOSIS — R05.3 CHRONIC COUGH: ICD-10-CM

## 2024-04-19 RX ORDER — ALBUTEROL SULFATE 90 UG/1
2 AEROSOL, METERED RESPIRATORY (INHALATION) EVERY 6 HOURS PRN
Qty: 18 G | Refills: 3 | Status: SHIPPED | OUTPATIENT
Start: 2024-04-19

## 2024-04-19 NOTE — TELEPHONE ENCOUNTER
----- Message from Chelsea Spencer MA sent at 4/19/2024  8:46 AM CDT -----  Please call in albuterol inhaler to [facility] on [address] for pt.

## 2024-06-06 ENCOUNTER — OFFICE VISIT (OUTPATIENT)
Dept: FAMILY MEDICINE | Facility: CLINIC | Age: 64
End: 2024-06-06

## 2024-06-06 VITALS
WEIGHT: 137 LBS | SYSTOLIC BLOOD PRESSURE: 122 MMHG | TEMPERATURE: 98 F | OXYGEN SATURATION: 96 % | RESPIRATION RATE: 18 BRPM | HEART RATE: 88 BPM | BODY MASS INDEX: 22.02 KG/M2 | DIASTOLIC BLOOD PRESSURE: 72 MMHG | HEIGHT: 66 IN

## 2024-06-06 DIAGNOSIS — E78.49 OTHER HYPERLIPIDEMIA: Primary | ICD-10-CM

## 2024-06-06 DIAGNOSIS — J30.9 ALLERGIC RHINITIS, UNSPECIFIED SEASONALITY, UNSPECIFIED TRIGGER: ICD-10-CM

## 2024-06-06 PROCEDURE — 99214 OFFICE O/P EST MOD 30 MIN: CPT | Mod: ,,, | Performed by: FAMILY MEDICINE

## 2024-06-06 RX ORDER — LOVASTATIN 40 MG/1
40 TABLET ORAL NIGHTLY
Qty: 90 TABLET | Refills: 1 | Status: SHIPPED | OUTPATIENT
Start: 2024-06-06 | End: 2024-12-03

## 2024-06-06 RX ORDER — FLUTICASONE PROPIONATE 50 MCG
2 SPRAY, SUSPENSION (ML) NASAL DAILY
Qty: 16 G | Refills: 11 | Status: SHIPPED | OUTPATIENT
Start: 2024-06-06

## 2024-06-06 NOTE — PROGRESS NOTES
Esperanza Wakefield is a 63 y.o. female seen today for increased symptoms of nasal congestion postnasal drainage.  Patient reports she tried Zyrtec but this was too sedating the next day we discussed a trial of Flonase.  Patient's lipids were to goal when checked in December.  Patient's blood pressures have been excellent off of medication.  She denies any chest pain or shortness a breath and defers a mammogram and colon cancer screening at this time.      Past Medical History:   Diagnosis Date    Hyperlipidemia     Hypertension     SOB (shortness of breath)     Wheezing      Family History   Problem Relation Name Age of Onset    Lung cancer Mother      Hypertension Mother      Hypertension Father      Diabetes Father      Hypertension Sister      Diabetes Sister      Hypertension Brother      Diabetes Brother       Current Outpatient Medications on File Prior to Visit   Medication Sig Dispense Refill    albuterol (PROVENTIL/VENTOLIN HFA) 90 mcg/actuation inhaler Inhale 2 puffs into the lungs every 6 (six) hours as needed for Wheezing. Rescue 18 g 3    [DISCONTINUED] lovastatin (MEVACOR) 40 MG tablet Take 1 tablet (40 mg total) by mouth every evening. 90 tablet 2    HYDROcodone-acetaminophen (NORCO) 7.5-325 mg per tablet Take 1 tablet by mouth every 8 (eight) hours as needed for Pain. (Patient not taking: Reported on 6/6/2024) 12 tablet 0     No current facility-administered medications on file prior to visit.     Immunization History   Administered Date(s) Administered    COVID-19, MRNA, LN-S, PF (MODERNA FULL 0.5 ML DOSE) 04/01/2021    COVID-19, MRNA, LN-S, PF (Pfizer) (Purple Cap) 04/01/2021    COVID-19, vector-nr, rS-Ad26, PF (Nolberto) 04/12/2021    Influenza - Quadrivalent - PF *Preferred* (6 months and older) 02/16/2022, 12/07/2023    Tdap 05/13/2022       Review of Systems   Constitutional:  Negative for fever, malaise/fatigue and weight loss.   HENT:  Positive for congestion.    Respiratory:  Negative for shortness  of breath.    Cardiovascular:  Negative for chest pain and palpitations.   Gastrointestinal:  Negative for nausea and vomiting.   Psychiatric/Behavioral:  Negative for depression.         Vitals:    06/06/24 0831   BP: 122/72   Pulse: 88   Resp: 18   Temp: 97.7 °F (36.5 °C)       Physical Exam  Vitals reviewed.   Constitutional:       Appearance: Normal appearance.   HENT:      Head: Normocephalic.      Nose:      Right Turbinates: Swollen.      Left Turbinates: Swollen.      Comments: Patient has copious clear postnasal drainage  Eyes:      Extraocular Movements: Extraocular movements intact.      Conjunctiva/sclera: Conjunctivae normal.      Pupils: Pupils are equal, round, and reactive to light.   Neck:      Thyroid: No thyroid mass or thyromegaly.   Cardiovascular:      Rate and Rhythm: Normal rate and regular rhythm.      Heart sounds: Normal heart sounds. No murmur heard.     No gallop.   Pulmonary:      Effort: Pulmonary effort is normal. No respiratory distress.      Breath sounds: Normal breath sounds. No wheezing or rales.   Skin:     General: Skin is warm and dry.      Coloration: Skin is not jaundiced or pale.   Neurological:      Mental Status: She is alert.   Psychiatric:         Mood and Affect: Mood normal.         Behavior: Behavior normal.         Thought Content: Thought content normal.         Judgment: Judgment normal.          Assessment and Plan  1. Other hyperlipidemia  -     lovastatin (MEVACOR) 40 MG tablet; Take 1 tablet (40 mg total) by mouth every evening.  Dispense: 90 tablet; Refill: 1  -     CBC Auto Differential; Future; Expected date: 12/06/2024  -     Comprehensive Metabolic Panel; Future; Expected date: 12/06/2024  -     Lipid Panel; Future; Expected date: 12/06/2024    2. Allergic rhinitis, unspecified seasonality, unspecified trigger  -     fluticasone propionate (FLONASE) 50 mcg/actuation nasal spray; 2 sprays (100 mcg total) by Each Nostril route once daily.  Dispense: 16 g;  Refill: 11             Return to clinic in 6 months fasting for lab work or as needed    Health Maintenance Topics with due status: Not Due       Topic Last Completion Date    TETANUS VACCINE 05/13/2022    Lipid Panel 12/06/2023

## 2024-12-06 DIAGNOSIS — R05.3 CHRONIC COUGH: ICD-10-CM

## 2024-12-06 RX ORDER — ALBUTEROL SULFATE 90 UG/1
2 INHALANT RESPIRATORY (INHALATION) EVERY 6 HOURS PRN
Qty: 18 G | Refills: 3 | Status: SHIPPED | OUTPATIENT
Start: 2024-12-06

## 2024-12-06 NOTE — TELEPHONE ENCOUNTER
----- Message from Janette sent at 12/6/2024  9:52 AM CST -----  Regarding: Med Refill request  Pt requesting refill of albuterol (PROVENTIL/VENTOLIN HFA) 90 mcg/actuation inhaler.    Pharmacy: [facility] [address] [place] [address]  Pt phone #: [phone]

## 2025-01-22 ENCOUNTER — OFFICE VISIT (OUTPATIENT)
Dept: FAMILY MEDICINE | Facility: CLINIC | Age: 65
End: 2025-01-22

## 2025-01-22 VITALS
WEIGHT: 126 LBS | OXYGEN SATURATION: 98 % | HEIGHT: 66 IN | HEART RATE: 83 BPM | BODY MASS INDEX: 20.25 KG/M2 | DIASTOLIC BLOOD PRESSURE: 82 MMHG | RESPIRATION RATE: 19 BRPM | SYSTOLIC BLOOD PRESSURE: 128 MMHG | TEMPERATURE: 98 F

## 2025-01-22 DIAGNOSIS — F17.200 SMOKING: ICD-10-CM

## 2025-01-22 DIAGNOSIS — Z28.21 FLU VACCINE REFUSED: ICD-10-CM

## 2025-01-22 DIAGNOSIS — E78.49 OTHER HYPERLIPIDEMIA: ICD-10-CM

## 2025-01-22 DIAGNOSIS — Z28.21 REFUSED STREPTOCOCCUS PNEUMONIAE VACCINATION: ICD-10-CM

## 2025-01-22 DIAGNOSIS — R49.0 HOARSENESS OF VOICE: Primary | ICD-10-CM

## 2025-01-22 PROCEDURE — 99213 OFFICE O/P EST LOW 20 MIN: CPT | Mod: 25,,, | Performed by: FAMILY MEDICINE

## 2025-01-22 PROCEDURE — 96372 THER/PROPH/DIAG INJ SC/IM: CPT | Mod: ,,, | Performed by: FAMILY MEDICINE

## 2025-01-22 RX ORDER — BETAMETHASONE SODIUM PHOSPHATE AND BETAMETHASONE ACETATE 3; 3 MG/ML; MG/ML
6 INJECTION, SUSPENSION INTRA-ARTICULAR; INTRALESIONAL; INTRAMUSCULAR; SOFT TISSUE
Status: COMPLETED | OUTPATIENT
Start: 2025-01-22 | End: 2025-01-22

## 2025-01-22 RX ORDER — BUPROPION HYDROCHLORIDE 150 MG/1
TABLET, EXTENDED RELEASE ORAL
Qty: 30 TABLET | Refills: 2 | Status: SHIPPED | OUTPATIENT
Start: 2025-01-22

## 2025-01-22 RX ORDER — LOVASTATIN 40 MG/1
40 TABLET ORAL NIGHTLY
Qty: 90 TABLET | Refills: 1 | Status: SHIPPED | OUTPATIENT
Start: 2025-01-22 | End: 2025-07-21

## 2025-01-22 RX ADMIN — BETAMETHASONE SODIUM PHOSPHATE AND BETAMETHASONE ACETATE 6 MG: 3; 3 INJECTION, SUSPENSION INTRA-ARTICULAR; INTRALESIONAL; INTRAMUSCULAR; SOFT TISSUE at 02:01

## 2025-01-22 NOTE — PROGRESS NOTES
Esperanza Wakefield is a 64 y.o. female seen today for follow-up on her hyperlipidemia.  Her lipids were to goal and she has had no chest pain and no shortness a breath above baseline.  She has had a worsening cough and also worsening hoarseness lately as well as increased postnasal drainage.  She reports the Flonase did not help and I recommended Claritin over-the-counter since the Zyrtec was too sedating.  We also spent some time discussing smoking cessation and she is currently smoking less than a pack per day.  Unfortunately she has smoked over 40 years and we discussed a low-dose CT scan in the need for an ear nose and throat evaluation with a worsening hoarseness.    Past Medical History:   Diagnosis Date    Hyperlipidemia     Hypertension     SOB (shortness of breath)     Wheezing      Family History   Problem Relation Name Age of Onset    Lung cancer Mother      Hypertension Mother      Hypertension Father      Diabetes Father      Hypertension Sister      Diabetes Sister      Hypertension Brother      Diabetes Brother       Current Outpatient Medications on File Prior to Visit   Medication Sig Dispense Refill    albuterol (PROVENTIL/VENTOLIN HFA) 90 mcg/actuation inhaler Inhale 2 puffs into the lungs every 6 (six) hours as needed for Wheezing. Rescue 18 g 3    fluticasone propionate (FLONASE) 50 mcg/actuation nasal spray 2 sprays (100 mcg total) by Each Nostril route once daily. 16 g 11    HYDROcodone-acetaminophen (NORCO) 7.5-325 mg per tablet Take 1 tablet by mouth every 8 (eight) hours as needed for Pain. (Patient not taking: Reported on 1/22/2025) 12 tablet 0    [DISCONTINUED] lovastatin (MEVACOR) 40 MG tablet Take 1 tablet (40 mg total) by mouth every evening. 90 tablet 1     No current facility-administered medications on file prior to visit.     Immunization History   Administered Date(s) Administered    COVID-19, MRNA, LN-S, PF (MODERNA FULL 0.5 ML DOSE) 04/01/2021    COVID-19, MRNA, LN-S, PF (Pfizer)  (Purple Cap) 04/01/2021    COVID-19, vector-nr, rS-Ad26, PF (Nolberto) 04/12/2021    Influenza - Quadrivalent - PF *Preferred* (6 months and older) 02/16/2022, 12/07/2023    Tdap 05/13/2022       Review of Systems   Constitutional:  Negative for fever, malaise/fatigue and weight loss.   HENT:  Positive for congestion.    Respiratory:  Positive for cough. Negative for shortness of breath.    Cardiovascular:  Negative for chest pain and palpitations.   Gastrointestinal:  Negative for nausea and vomiting.   Psychiatric/Behavioral:  Negative for depression.         Vitals:    01/22/25 1326   BP: 128/82   Pulse: 83   Resp: 19   Temp: 97.5 °F (36.4 °C)       Physical Exam  Vitals reviewed.   Constitutional:       Appearance: Normal appearance.   HENT:      Head: Normocephalic.      Mouth/Throat:      Pharynx: Posterior oropharyngeal erythema and postnasal drip present. No pharyngeal swelling, oropharyngeal exudate or uvula swelling.   Eyes:      Extraocular Movements: Extraocular movements intact.      Conjunctiva/sclera: Conjunctivae normal.      Pupils: Pupils are equal, round, and reactive to light.   Neck:      Thyroid: No thyroid mass or thyromegaly.   Cardiovascular:      Rate and Rhythm: Normal rate and regular rhythm.      Heart sounds: Normal heart sounds. No murmur heard.     No gallop.   Pulmonary:      Effort: Pulmonary effort is normal. No respiratory distress.      Breath sounds: Normal breath sounds. No wheezing or rales.   Lymphadenopathy:      Cervical: No cervical adenopathy.   Skin:     General: Skin is warm and dry.      Coloration: Skin is not jaundiced or pale.   Neurological:      Mental Status: She is alert.   Psychiatric:         Mood and Affect: Mood normal.         Behavior: Behavior normal.         Thought Content: Thought content normal.         Judgment: Judgment normal.          Assessment and Plan  1. Hoarseness of voice  -     betamethasone acetate-betamethasone sodium phosphate injection 6  mg  -     Ambulatory referral/consult to ENT; Future; Expected date: 01/29/2025    2. Flu vaccine refused    3. Refused Streptococcus pneumoniae vaccination    4. Other hyperlipidemia  -     lovastatin (MEVACOR) 40 MG tablet; Take 1 tablet (40 mg total) by mouth every evening.  Dispense: 90 tablet; Refill: 1    5. Smoking  -     CT Chest Lung Screening Low Dose; Future; Expected date: 01/22/2025  -     buPROPion (WELLBUTRIN SR) 150 MG TBSR 12 hr tablet; Take 1 p.o. q.h.s.  Dispense: 30 tablet; Refill: 2             Return to clinic in 2-3 weeks after nose and throat evaluation.    Health Maintenance Topics with due status: Not Due       Topic Last Completion Date    TETANUS VACCINE 05/13/2022    Lipid Panel 01/22/2025    RSV Vaccine (Age 60+ and Pregnant patients) Not Due

## 2025-01-27 ENCOUNTER — OFFICE VISIT (OUTPATIENT)
Dept: OTOLARYNGOLOGY | Facility: CLINIC | Age: 65
End: 2025-01-27

## 2025-01-27 VITALS — HEIGHT: 66 IN | WEIGHT: 126 LBS | BODY MASS INDEX: 20.25 KG/M2

## 2025-01-27 DIAGNOSIS — R49.0 HOARSENESS OF VOICE: ICD-10-CM

## 2025-01-27 DIAGNOSIS — J38.00 VOCAL CORD PARALYSIS: Primary | ICD-10-CM

## 2025-01-27 PROCEDURE — 99999 PR PBB SHADOW E&M-EST. PATIENT-LVL IV: CPT | Mod: PBBFAC,,, | Performed by: OTOLARYNGOLOGY

## 2025-01-27 PROCEDURE — 31575 DIAGNOSTIC LARYNGOSCOPY: CPT | Mod: S$PBB,,, | Performed by: OTOLARYNGOLOGY

## 2025-01-27 PROCEDURE — 31575 DIAGNOSTIC LARYNGOSCOPY: CPT | Mod: PBBFAC | Performed by: OTOLARYNGOLOGY

## 2025-01-27 PROCEDURE — 99214 OFFICE O/P EST MOD 30 MIN: CPT | Mod: PBBFAC | Performed by: OTOLARYNGOLOGY

## 2025-01-27 PROCEDURE — 99204 OFFICE O/P NEW MOD 45 MIN: CPT | Mod: 25,S$PBB,, | Performed by: OTOLARYNGOLOGY

## 2025-01-27 NOTE — PROGRESS NOTES
"Subjective:       Patient ID: Esperanza Wakefield is a 64 y.o. female.    Chief Complaint: Hoarse (Patient referred for hoarseness. She complains of being hoarse for 10 weeks. She complains of her throat feeling "thick" but not sore. The left side of her neck felt sore to the touch this morning.) and Sinusitis (She complains of having sinus congestion, drainage and pressure. States she has been using Flonase and taking Mucinex prn.)  Hoarse around 10 weeks smoker   Sinusitis      Review of Systems   HENT:  Positive for trouble swallowing and voice change.    All other systems reviewed and are negative.      Objective:      Physical Exam  General: NAD Hoarse breathy voice   Head: Normocephalic, atraumatic, no facial asymmetry/normal strength,  Ears: Both auricules normal in appearance, w/o deformities tympanic membranes normal external auditory canals normal  Nose: External nose w/o deformities normal turbinates no drainage or inflammation  Oral Cavity: Lips, gums, floor of mouth, tongue hard palate, and buccal mucosa without mass/lesion  Oropharynx: Mucosa pink and moist, soft palate, posterior pharynx and oropharyngeal wall without mass/lesion  Neck: Supple, symmetric, trachea midline, no palpable mass/lesion, no palpable cervical lymphadenopathy  Skin: Warm and dry, no concerning lesions  Respiratory: Respirations even, unlabored    Nasal/Nasopharyngo/Laryn/Hypopharyngoscopy Procedures   Procedure: Flexible laryngoscopy  In order to fully examine the upper aerodigestive tract, including the larynx, in a patient with a hyperactive gag reflex, flexible endoscopy is required.  After explaining the procedure and obtaining verbal consent, a timeout was performed with the patient's participation according to the universal protocol. Both nasal cavities were anesthetized with 4% Xylocaine spray mixed with Joe-Synephrine. The flexible laryngoscope was inserted into the nasal cavity and advanced to visualize the nasal cavity, " nasopharynx, the posterior oropharynx, hypopharynx, and the endolarynx with the above findings noted. The scope was removed and the procedure terminated. The patient tolerated this procedure well without apparent complication.      Procedure:  Diagnostic nasal, nasopharyngoscopy, laryngoscopy and hypopharyngoscopy.    Routine preparation with local atomizer with 1% neosynephrine and lidocaine . With customary flexible endoscope.     NOSE:   External:  No gross deformity   Intranasal:    Mucosa:  No polyps, ulcers or lesions.    Septum:  No gross deformity.    Turbinates:  Not enlarged.    Nasopharynx:  No lesions.   Mucosa:  No lesions.   Posterior Choanae:  Patent.   Eustachian Tubes:  Patent.  Larynx/hypopharynx:   Epiglottis:  No lesions, without edema.   AE Folds:  No lesions.   Vocal cords:  No polyps; Left VC paralysis    Subglottis: No obvious stenosis   Hypopharynx:  No lesions.   Piriform sinus:  No pooling or lesions.   Post Cricoid:  No edema or erythema  Assessment:       1. Vocal cord paralysis    2. Hoarseness of voice        Plan:       CT chest CT neck to evaluate vagus nerve

## 2025-01-30 ENCOUNTER — TELEPHONE (OUTPATIENT)
Dept: OTOLARYNGOLOGY | Facility: CLINIC | Age: 65
End: 2025-01-30

## 2025-01-30 NOTE — TELEPHONE ENCOUNTER
----- Message from Tech Laturina sent at 1/30/2025  8:20 AM CST -----  Who Called: Esperanza Ashu    Caller is requesting assistance/information from provider's office.          Preferred Method of Contact: Phone Call  Patient's Preferred Phone Number on File: [phone]   Best Call Back Number, if different:  Additional Information: patient said she has a CT scheduled for tomorrow, and she forgot to tell DR Lopez her left ear has been hurting for a couple months, she said she didn't know if that was important.        Returned pt's phone call re: left ear pain. Dr. Lopez will check her ears when she comes back for CT results. Pt agreed and voiced understanding.

## 2025-01-31 ENCOUNTER — HOSPITAL ENCOUNTER (OUTPATIENT)
Dept: RADIOLOGY | Facility: HOSPITAL | Age: 65
Discharge: HOME | End: 2025-01-31
Attending: OTOLARYNGOLOGY

## 2025-01-31 DIAGNOSIS — J38.00 VOCAL CORD PARALYSIS: ICD-10-CM

## 2025-01-31 PROCEDURE — 70491 CT SOFT TISSUE NECK W/DYE: CPT | Mod: 26,,, | Performed by: STUDENT IN AN ORGANIZED HEALTH CARE EDUCATION/TRAINING PROGRAM

## 2025-01-31 PROCEDURE — 25500020 PHARM REV CODE 255: Performed by: OTOLARYNGOLOGY

## 2025-01-31 PROCEDURE — 71260 CT THORAX DX C+: CPT | Mod: 26,,, | Performed by: STUDENT IN AN ORGANIZED HEALTH CARE EDUCATION/TRAINING PROGRAM

## 2025-01-31 PROCEDURE — 71260 CT THORAX DX C+: CPT | Mod: TC

## 2025-01-31 PROCEDURE — 70491 CT SOFT TISSUE NECK W/DYE: CPT | Mod: TC

## 2025-01-31 RX ORDER — IOPAMIDOL 755 MG/ML
100 INJECTION, SOLUTION INTRAVASCULAR
Status: COMPLETED | OUTPATIENT
Start: 2025-01-31 | End: 2025-01-31

## 2025-01-31 RX ADMIN — IOPAMIDOL 100 ML: 755 INJECTION, SOLUTION INTRAVENOUS at 12:01

## 2025-02-03 ENCOUNTER — OFFICE VISIT (OUTPATIENT)
Dept: OTOLARYNGOLOGY | Facility: CLINIC | Age: 65
End: 2025-02-03

## 2025-02-03 VITALS — HEIGHT: 66 IN | WEIGHT: 126 LBS | BODY MASS INDEX: 20.25 KG/M2

## 2025-02-03 DIAGNOSIS — J38.00 VOCAL CORD PARALYSIS: ICD-10-CM

## 2025-02-03 DIAGNOSIS — R49.0 HOARSENESS OF VOICE: Primary | ICD-10-CM

## 2025-02-03 DIAGNOSIS — H65.22 LEFT CHRONIC SEROUS OTITIS MEDIA: ICD-10-CM

## 2025-02-03 DIAGNOSIS — R91.8 MASS OF HILUM: ICD-10-CM

## 2025-02-03 PROCEDURE — 99213 OFFICE O/P EST LOW 20 MIN: CPT | Mod: S$PBB,,, | Performed by: OTOLARYNGOLOGY

## 2025-02-03 PROCEDURE — 99213 OFFICE O/P EST LOW 20 MIN: CPT | Mod: PBBFAC | Performed by: OTOLARYNGOLOGY

## 2025-02-03 PROCEDURE — 99999 PR PBB SHADOW E&M-EST. PATIENT-LVL III: CPT | Mod: PBBFAC,,, | Performed by: OTOLARYNGOLOGY

## 2025-02-03 NOTE — PROGRESS NOTES
Subjective:       Patient ID: Esperanza Wakefield is a 64 y.o. female.    Chief Complaint: Follow-up (Patient following up for CT chest and CT neck results.)    Follow-up      Review of Systems   HENT:  Positive for ear pain, hearing loss and voice change.    All other systems reviewed and are negative.      Objective:      Physical Exam  General: NAD Hoarse  Head: Normocephalic, atraumatic, no facial asymmetry/normal strength,  Ears: Both auricules normal in appearance, w/o deformities tympanic membranes fluid left external auditory canals normal  Nose: External nose w/o deformities normal turbinates no drainage or inflammation  Oral Cavity: Lips, gums, floor of mouth, tongue hard palate, and buccal mucosa without mass/lesion  Oropharynx: Mucosa pink and moist, soft palate, posterior pharynx and oropharyngeal wall without mass/lesion  Neck: Supple, symmetric, trachea midline, no palpable mass/lesion, no palpable cervical lymphadenopathy  Skin: Warm and dry, no concerning lesions  Respiratory: Respirations even, unlabored  Assessment:       1. Hoarseness of voice    2. Vocal cord paralysis    3. Left chronic serous otitis media        Plan:       CT results pending     Will call when results come back

## 2025-02-13 ENCOUNTER — OFFICE VISIT (OUTPATIENT)
Dept: PULMONOLOGY | Facility: CLINIC | Age: 65
End: 2025-02-13

## 2025-02-13 VITALS
DIASTOLIC BLOOD PRESSURE: 86 MMHG | WEIGHT: 125.69 LBS | HEIGHT: 66 IN | OXYGEN SATURATION: 97 % | RESPIRATION RATE: 16 BRPM | SYSTOLIC BLOOD PRESSURE: 128 MMHG | BODY MASS INDEX: 20.2 KG/M2 | HEART RATE: 60 BPM

## 2025-02-13 DIAGNOSIS — J44.9 CHRONIC OBSTRUCTIVE PULMONARY DISEASE, UNSPECIFIED COPD TYPE: ICD-10-CM

## 2025-02-13 DIAGNOSIS — R59.0 MEDIASTINAL ADENOPATHY: ICD-10-CM

## 2025-02-13 DIAGNOSIS — R91.8 MASS OF HILUM: Primary | ICD-10-CM

## 2025-02-13 PROCEDURE — 99214 OFFICE O/P EST MOD 30 MIN: CPT | Mod: PBBFAC | Performed by: STUDENT IN AN ORGANIZED HEALTH CARE EDUCATION/TRAINING PROGRAM

## 2025-02-13 RX ORDER — FLUTICASONE FUROATE, UMECLIDINIUM BROMIDE AND VILANTEROL TRIFENATATE 200; 62.5; 25 UG/1; UG/1; UG/1
1 POWDER RESPIRATORY (INHALATION) DAILY
Qty: 28 EACH | Refills: 11 | Status: SHIPPED | OUTPATIENT
Start: 2025-02-13

## 2025-02-13 NOTE — H&P (VIEW-ONLY)
Patient Name: Esperanza Wakefield   Primary Care Provider: Gildardo West MD   Date of Service: 02/13/2025   Reason for Referral:  Lung mass    Chief Complaint: Pulmonary Nodules (New patient referred by Dr Lopez for a pulm nodule. )      Subjective:      Esperanza Wakefield is 64 y.o. female with past medical history significant for COPD, emphysema phenotype presents today after evaluation in the emergency department which showed evidence of lung mass on imaging.      Initial clinic visit 2/13/25   I reviewed her recent CT chest which was done on 1/31/25 which showed large left hilar mass, in the station 4 L location along with bilateral pulmonary nodules.  I performed an ultrasound here in the clinic and could not appreciate any supraclavicular lymphadenopathy.  She had presented with hoarseness of voice which started about 2-3 months ago, saw our ENT colleagues and then was referred over to pulmonary after her recent findings.  No hemoptysis, weight loss.  No other cardiac comorbidities.  I also reviewed her recent lab work and electrolytes were grossly within normal limits.  Scheduled for EBUS bronchoscopy at next immediately available appointment at which time we will perform TBNA, send samples for RPMI/flow cytometry, cultures and cytology/pathology analysis.  I have also moved her over to [facility] for triple inhaled therapy.        Assessment and Plan      Lung mass  Multiple pulmonary nodules  COPD, emphysema phenotype  Mediastinal adenopathy      Assessment:  Imaging reviewed as above, with lung mass present.  Hoarseness of voice which started 2-3 months ago saw ENT.  Imaging as above.  Performed bedside ultrasound with no significant evidence of supraclavicular lymphadenopathy on my exam.  She is now candidate for EBUS bronchoscopy sampling of lung mass (with staging starting on the right side and proceeding to the left).  Malignancy is high in the differential and I have shared this with the patient.  I communicated  this with the patient's referring physician Dr. Lopez from ENT as was the patient's primary care physician Dr. West.    Plan  Candidate for EBUS bronchoscopy with samples to be sent out for RPMI/flow cytometry, culture analysis as well as pathology analysis   Staging studies to be set up after EBUS bronchoscopy if proven to be malignant   We will obtain pulmonary function tests and 6 minute walk test at follow up visits  Counseled to call the clinic or go to the emergency department/call 911 in the event of worsening symptoms or any other red flag signs/symptoms as explained to the patient in detail              Follow-up   Follow-up  after EBUS bronchoscopy as above    Yury Tyson MD  Interventional Pulmonary and Critical Care  [facility]          Problem List Items Addressed This Visit    None  Visit Diagnoses       Mass of hilum                    Past Medical History:   Diagnosis Date    Hyperlipidemia     Hypertension     SOB (shortness of breath)     Wheezing       Past Surgical History:   Procedure Laterality Date    HERNIA REPAIR      SINUS SURGERY      TUBAL LIGATION      TUMOR REMOVAL       Family History   Problem Relation Name Age of Onset    Lung cancer Mother      Hypertension Mother      Hypertension Father      Diabetes Father      Hypertension Sister      Diabetes Sister      Hypertension Brother      Diabetes Brother       Review of patient's allergies indicates:  No Known Allergies   Social History     Tobacco Use    Smoking status: Every Day     Current packs/day: 0.50     Average packs/day: 0.5 packs/day for 40.0 years (20.0 ttl pk-yrs)     Types: Cigarettes     Passive exposure: Current    Smokeless tobacco: Never   Substance Use Topics    Alcohol use: Not Currently    Drug use: Never      Review of Systems       Objective:      Physical Exam           2/3/2025     8:53 AM 1/27/2025     8:50 AM 1/22/2025     1:26 PM 6/6/2024     8:31 AM 12/30/2023    10:25 AM 12/7/2023      "8:22 AM 6/7/2023     8:26 AM   Pulmonary Function Tests   SpO2   98 % 96 % 95 % 96 % 99 %   Height 5' 6" (1.676 m) 5' 6" (1.676 m) 5' 6" (1.676 m) 5' 6" (1.676 m) 5' 6" (1.676 m) 5' 6" (1.676 m) 5' 6" (1.676 m)   Weight 57.2 kg (126 lb) 57.2 kg (126 lb) 57.2 kg (126 lb) 62.1 kg (137 lb) 65.8 kg (145 lb) 64.5 kg (142 lb 3.2 oz) 63.5 kg (140 lb)   BMI (Calculated) 20.3 20.3 20.3 22.1 23.4 23 22.6         Outpatient Encounter Medications as of 2/13/2025   Medication Sig Dispense Refill    albuterol (PROVENTIL/VENTOLIN HFA) 90 mcg/actuation inhaler Inhale 2 puffs into the lungs every 6 (six) hours as needed for Wheezing. Rescue 18 g 3    buPROPion (WELLBUTRIN SR) 150 MG TBSR 12 hr tablet Take 1 p.o. q.h.s. 30 tablet 2    fluticasone propionate (FLONASE) 50 mcg/actuation nasal spray 2 sprays (100 mcg total) by Each Nostril route once daily. 16 g 11    lovastatin (MEVACOR) 40 MG tablet Take 1 tablet (40 mg total) by mouth every evening. 90 tablet 1     No facility-administered encounter medications on file as of 2/13/2025.       Assessment & Plan    As above                                          No orders of the defined types were placed in this encounter.                  "

## 2025-02-13 NOTE — PROGRESS NOTES
Patient Name: Esperanza Wakefield   Primary Care Provider: Gildardo West MD   Date of Service: 02/13/2025   Reason for Referral:  Lung mass    Chief Complaint: Pulmonary Nodules (New patient referred by Dr Lopez for a pulm nodule. )      Subjective:      Esperanza Wakefield is 64 y.o. female with past medical history significant for COPD, emphysema phenotype presents today after evaluation in the emergency department which showed evidence of lung mass on imaging.      Initial clinic visit 2/13/25   I reviewed her recent CT chest which was done on 1/31/25 which showed large left hilar mass, in the station 4 L location along with bilateral pulmonary nodules.  I performed an ultrasound here in the clinic and could not appreciate any supraclavicular lymphadenopathy.  She had presented with hoarseness of voice which started about 2-3 months ago, saw our ENT colleagues and then was referred over to pulmonary after her recent findings.  No hemoptysis, weight loss.  No other cardiac comorbidities.  I also reviewed her recent lab work and electrolytes were grossly within normal limits.  Scheduled for EBUS bronchoscopy at next immediately available appointment at which time we will perform TBNA, send samples for RPMI/flow cytometry, cultures and cytology/pathology analysis.  I have also moved her over to [facility] for triple inhaled therapy.        Assessment and Plan      Lung mass  Multiple pulmonary nodules  COPD, emphysema phenotype  Mediastinal adenopathy      Assessment:  Imaging reviewed as above, with lung mass present.  Hoarseness of voice which started 2-3 months ago saw ENT.  Imaging as above.  Performed bedside ultrasound with no significant evidence of supraclavicular lymphadenopathy on my exam.  She is now candidate for EBUS bronchoscopy sampling of lung mass (with staging starting on the right side and proceeding to the left).  Malignancy is high in the differential and I have shared this with the patient.  I communicated  this with the patient's referring physician Dr. Lopez from ENT as was the patient's primary care physician Dr. West.    Plan  Candidate for EBUS bronchoscopy with samples to be sent out for RPMI/flow cytometry, culture analysis as well as pathology analysis   Staging studies to be set up after EBUS bronchoscopy if proven to be malignant   We will obtain pulmonary function tests and 6 minute walk test at follow up visits  Counseled to call the clinic or go to the emergency department/call 911 in the event of worsening symptoms or any other red flag signs/symptoms as explained to the patient in detail              Follow-up   Follow-up  after EBUS bronchoscopy as above    Yury Tyson MD  Interventional Pulmonary and Critical Care  [facility]          Problem List Items Addressed This Visit    None  Visit Diagnoses       Mass of hilum                    Past Medical History:   Diagnosis Date    Hyperlipidemia     Hypertension     SOB (shortness of breath)     Wheezing       Past Surgical History:   Procedure Laterality Date    HERNIA REPAIR      SINUS SURGERY      TUBAL LIGATION      TUMOR REMOVAL       Family History   Problem Relation Name Age of Onset    Lung cancer Mother      Hypertension Mother      Hypertension Father      Diabetes Father      Hypertension Sister      Diabetes Sister      Hypertension Brother      Diabetes Brother       Review of patient's allergies indicates:  No Known Allergies   Social History     Tobacco Use    Smoking status: Every Day     Current packs/day: 0.50     Average packs/day: 0.5 packs/day for 40.0 years (20.0 ttl pk-yrs)     Types: Cigarettes     Passive exposure: Current    Smokeless tobacco: Never   Substance Use Topics    Alcohol use: Not Currently    Drug use: Never      Review of Systems       Objective:      Physical Exam           2/3/2025     8:53 AM 1/27/2025     8:50 AM 1/22/2025     1:26 PM 6/6/2024     8:31 AM 12/30/2023    10:25 AM 12/7/2023      "8:22 AM 6/7/2023     8:26 AM   Pulmonary Function Tests   SpO2   98 % 96 % 95 % 96 % 99 %   Height 5' 6" (1.676 m) 5' 6" (1.676 m) 5' 6" (1.676 m) 5' 6" (1.676 m) 5' 6" (1.676 m) 5' 6" (1.676 m) 5' 6" (1.676 m)   Weight 57.2 kg (126 lb) 57.2 kg (126 lb) 57.2 kg (126 lb) 62.1 kg (137 lb) 65.8 kg (145 lb) 64.5 kg (142 lb 3.2 oz) 63.5 kg (140 lb)   BMI (Calculated) 20.3 20.3 20.3 22.1 23.4 23 22.6         Outpatient Encounter Medications as of 2/13/2025   Medication Sig Dispense Refill    albuterol (PROVENTIL/VENTOLIN HFA) 90 mcg/actuation inhaler Inhale 2 puffs into the lungs every 6 (six) hours as needed for Wheezing. Rescue 18 g 3    buPROPion (WELLBUTRIN SR) 150 MG TBSR 12 hr tablet Take 1 p.o. q.h.s. 30 tablet 2    fluticasone propionate (FLONASE) 50 mcg/actuation nasal spray 2 sprays (100 mcg total) by Each Nostril route once daily. 16 g 11    lovastatin (MEVACOR) 40 MG tablet Take 1 tablet (40 mg total) by mouth every evening. 90 tablet 1     No facility-administered encounter medications on file as of 2/13/2025.       Assessment & Plan    As above                                          No orders of the defined types were placed in this encounter.                  "

## 2025-02-21 ENCOUNTER — TELEPHONE (OUTPATIENT)
Dept: GASTROENTEROLOGY | Facility: HOSPITAL | Age: 65
End: 2025-02-21

## 2025-02-21 NOTE — TELEPHONE ENCOUNTER
Spoke with patient concerning upcoming EBUS procedure scheduled for 02.24.25. Patient confirmed appt time of 0900 and arrival time of 0800. Patient confirmed address of facility. Patient expressed understanding of all pre-procedure instructions. Patient confirmed NPO status after midnight on 02.23.25. Pt denied having any cardiac implanted devices, pacemakers, or neuro-stimulators. Patient confirmed having a  present for procedure. Discussed a tentative schedule of times for the day of the procedure. Answered all questions and concerns at this time.

## 2025-02-24 ENCOUNTER — HOSPITAL ENCOUNTER (OUTPATIENT)
Dept: GASTROENTEROLOGY | Facility: HOSPITAL | Age: 65
Discharge: HOME | End: 2025-02-24
Attending: STUDENT IN AN ORGANIZED HEALTH CARE EDUCATION/TRAINING PROGRAM

## 2025-02-24 ENCOUNTER — ANESTHESIA EVENT (OUTPATIENT)
Dept: GASTROENTEROLOGY | Facility: HOSPITAL | Age: 65
End: 2025-02-24

## 2025-02-24 ENCOUNTER — ANESTHESIA (OUTPATIENT)
Dept: GASTROENTEROLOGY | Facility: HOSPITAL | Age: 65
End: 2025-02-24

## 2025-02-24 VITALS
BODY MASS INDEX: 20.09 KG/M2 | TEMPERATURE: 98 F | SYSTOLIC BLOOD PRESSURE: 117 MMHG | HEIGHT: 66 IN | DIASTOLIC BLOOD PRESSURE: 57 MMHG | HEART RATE: 89 BPM | RESPIRATION RATE: 18 BRPM | WEIGHT: 125 LBS | OXYGEN SATURATION: 92 %

## 2025-02-24 DIAGNOSIS — R91.8 MASS OF HILUM: ICD-10-CM

## 2025-02-24 DIAGNOSIS — C34.90 SMALL CELL CARCINOMA OF LUNG, UNSPECIFIED LATERALITY, UNSPECIFIED PART OF LUNG: Primary | ICD-10-CM

## 2025-02-24 DIAGNOSIS — C34.90 MALIGNANT NEOPLASM OF LUNG, UNSPECIFIED LATERALITY, UNSPECIFIED PART OF LUNG: ICD-10-CM

## 2025-02-24 DIAGNOSIS — C34.90 MALIGNANT NEOPLASM OF UNSPECIFIED PART OF UNSPECIFIED BRONCHUS OR LUNG: ICD-10-CM

## 2025-02-24 LAB
CLARITY FLD: ABNORMAL
COLOR FLD: ABNORMAL
GRANULOCYTES NFR FLD MANUAL: 81 % (ref 0–25)
LYMPHOCYTES NFR FLD MANUAL: 1 %
MACROPHAGES NFR FLD MANUAL: 18 %
RBC # FLD MANUAL: 4475 /CUMM
WBC # FLD MANUAL: 178 /CUMM

## 2025-02-24 PROCEDURE — 25000242 PHARM REV CODE 250 ALT 637 W/ HCPCS: Performed by: ANESTHESIOLOGY

## 2025-02-24 PROCEDURE — 37000008 HC ANESTHESIA 1ST 15 MINUTES

## 2025-02-24 PROCEDURE — 31624 DX BRONCHOSCOPE/LAVAGE: CPT | Mod: 51,,, | Performed by: STUDENT IN AN ORGANIZED HEALTH CARE EDUCATION/TRAINING PROGRAM

## 2025-02-24 PROCEDURE — 88184 FLOWCYTOMETRY/ TC 1 MARKER: CPT | Performed by: STUDENT IN AN ORGANIZED HEALTH CARE EDUCATION/TRAINING PROGRAM

## 2025-02-24 PROCEDURE — 88185 FLOWCYTOMETRY/TC ADD-ON: CPT

## 2025-02-24 PROCEDURE — 87070 CULTURE OTHR SPECIMN AEROBIC: CPT | Performed by: STUDENT IN AN ORGANIZED HEALTH CARE EDUCATION/TRAINING PROGRAM

## 2025-02-24 PROCEDURE — 31652 BRONCH EBUS SAMPLNG 1/2 NODE: CPT | Mod: ,,, | Performed by: STUDENT IN AN ORGANIZED HEALTH CARE EDUCATION/TRAINING PROGRAM

## 2025-02-24 PROCEDURE — 37000009 HC ANESTHESIA EA ADD 15 MINS

## 2025-02-24 PROCEDURE — 27000177 HC AIRWAY, LARYNGEAL MASK: Performed by: ANESTHESIOLOGY

## 2025-02-24 PROCEDURE — 31652 BRONCH EBUS SAMPLNG 1/2 NODE: CPT | Performed by: STUDENT IN AN ORGANIZED HEALTH CARE EDUCATION/TRAINING PROGRAM

## 2025-02-24 PROCEDURE — 88341 IMHCHEM/IMCYTCHM EA ADD ANTB: CPT | Mod: TC,91,MCY | Performed by: STUDENT IN AN ORGANIZED HEALTH CARE EDUCATION/TRAINING PROGRAM

## 2025-02-24 PROCEDURE — 25000003 PHARM REV CODE 250: Performed by: NURSE ANESTHETIST, CERTIFIED REGISTERED

## 2025-02-24 PROCEDURE — D9220A PRA ANESTHESIA: Mod: ,,, | Performed by: ANESTHESIOLOGY

## 2025-02-24 PROCEDURE — 89050 BODY FLUID CELL COUNT: CPT | Performed by: STUDENT IN AN ORGANIZED HEALTH CARE EDUCATION/TRAINING PROGRAM

## 2025-02-24 PROCEDURE — 27000716 HC OXISENSOR PROBE, ANY SIZE: Performed by: ANESTHESIOLOGY

## 2025-02-24 PROCEDURE — 27201423 OPTIME MED/SURG SUP & DEVICES STERILE SUPPLY

## 2025-02-24 PROCEDURE — 94640 AIRWAY INHALATION TREATMENT: CPT

## 2025-02-24 PROCEDURE — 27000510 HC BLANKET BAIR HUGGER ANY SIZE: Performed by: ANESTHESIOLOGY

## 2025-02-24 PROCEDURE — 31624 DX BRONCHOSCOPE/LAVAGE: CPT | Performed by: STUDENT IN AN ORGANIZED HEALTH CARE EDUCATION/TRAINING PROGRAM

## 2025-02-24 PROCEDURE — 63600175 PHARM REV CODE 636 W HCPCS: Performed by: NURSE ANESTHETIST, CERTIFIED REGISTERED

## 2025-02-24 RX ORDER — IPRATROPIUM BROMIDE AND ALBUTEROL SULFATE 2.5; .5 MG/3ML; MG/3ML
3 SOLUTION RESPIRATORY (INHALATION)
Status: COMPLETED | OUTPATIENT
Start: 2025-02-24 | End: 2025-02-24

## 2025-02-24 RX ORDER — SODIUM CHLORIDE, SODIUM LACTATE, POTASSIUM CHLORIDE, CALCIUM CHLORIDE 600; 310; 30; 20 MG/100ML; MG/100ML; MG/100ML; MG/100ML
125 INJECTION, SOLUTION INTRAVENOUS CONTINUOUS
Status: DISCONTINUED | OUTPATIENT
Start: 2025-02-24 | End: 2025-02-25 | Stop reason: HOSPADM

## 2025-02-24 RX ORDER — FENTANYL CITRATE 50 UG/ML
INJECTION, SOLUTION INTRAMUSCULAR; INTRAVENOUS
Status: DISCONTINUED | OUTPATIENT
Start: 2025-02-24 | End: 2025-02-24

## 2025-02-24 RX ORDER — LIDOCAINE HYDROCHLORIDE 20 MG/ML
INJECTION, SOLUTION EPIDURAL; INFILTRATION; INTRACAUDAL; PERINEURAL
Status: DISCONTINUED | OUTPATIENT
Start: 2025-02-24 | End: 2025-02-24

## 2025-02-24 RX ORDER — ONDANSETRON HYDROCHLORIDE 2 MG/ML
4 INJECTION, SOLUTION INTRAVENOUS DAILY PRN
Status: DISCONTINUED | OUTPATIENT
Start: 2025-02-24 | End: 2025-02-25 | Stop reason: HOSPADM

## 2025-02-24 RX ORDER — DIPHENHYDRAMINE HYDROCHLORIDE 50 MG/ML
25 INJECTION INTRAMUSCULAR; INTRAVENOUS EVERY 6 HOURS PRN
Status: DISCONTINUED | OUTPATIENT
Start: 2025-02-24 | End: 2025-02-25 | Stop reason: HOSPADM

## 2025-02-24 RX ORDER — MORPHINE SULFATE 10 MG/ML
4 INJECTION INTRAMUSCULAR; INTRAVENOUS; SUBCUTANEOUS EVERY 5 MIN PRN
Status: DISCONTINUED | OUTPATIENT
Start: 2025-02-24 | End: 2025-02-25 | Stop reason: HOSPADM

## 2025-02-24 RX ORDER — SODIUM CHLORIDE 9 MG/ML
INJECTION, SOLUTION INTRAVENOUS
Status: COMPLETED
Start: 2025-02-24 | End: 2025-02-24

## 2025-02-24 RX ORDER — HYDROMORPHONE HYDROCHLORIDE 2 MG/ML
0.5 INJECTION, SOLUTION INTRAMUSCULAR; INTRAVENOUS; SUBCUTANEOUS EVERY 5 MIN PRN
Status: DISCONTINUED | OUTPATIENT
Start: 2025-02-24 | End: 2025-02-25 | Stop reason: HOSPADM

## 2025-02-24 RX ORDER — ROCURONIUM BROMIDE 10 MG/ML
INJECTION, SOLUTION INTRAVENOUS
Status: DISCONTINUED | OUTPATIENT
Start: 2025-02-24 | End: 2025-02-24

## 2025-02-24 RX ORDER — PROPOFOL 10 MG/ML
VIAL (ML) INTRAVENOUS CONTINUOUS PRN
Status: DISCONTINUED | OUTPATIENT
Start: 2025-02-24 | End: 2025-02-24

## 2025-02-24 RX ORDER — OXYCODONE HYDROCHLORIDE 5 MG/1
5 TABLET ORAL
Status: DISCONTINUED | OUTPATIENT
Start: 2025-02-24 | End: 2025-02-25 | Stop reason: HOSPADM

## 2025-02-24 RX ORDER — MIDAZOLAM HYDROCHLORIDE 1 MG/ML
INJECTION INTRAMUSCULAR; INTRAVENOUS
Status: DISCONTINUED | OUTPATIENT
Start: 2025-02-24 | End: 2025-02-24

## 2025-02-24 RX ORDER — PHENYLEPHRINE HYDROCHLORIDE 10 MG/ML
INJECTION INTRAVENOUS
Status: DISCONTINUED | OUTPATIENT
Start: 2025-02-24 | End: 2025-02-24

## 2025-02-24 RX ORDER — DEXAMETHASONE SODIUM PHOSPHATE 4 MG/ML
INJECTION, SOLUTION INTRA-ARTICULAR; INTRALESIONAL; INTRAMUSCULAR; INTRAVENOUS; SOFT TISSUE
Status: DISCONTINUED | OUTPATIENT
Start: 2025-02-24 | End: 2025-02-24

## 2025-02-24 RX ORDER — PROPOFOL 10 MG/ML
VIAL (ML) INTRAVENOUS
Status: DISCONTINUED | OUTPATIENT
Start: 2025-02-24 | End: 2025-02-24

## 2025-02-24 RX ORDER — ONDANSETRON HYDROCHLORIDE 2 MG/ML
INJECTION, SOLUTION INTRAVENOUS
Status: DISCONTINUED | OUTPATIENT
Start: 2025-02-24 | End: 2025-02-24

## 2025-02-24 RX ADMIN — SUGAMMADEX 200 MG: 100 INJECTION, SOLUTION INTRAVENOUS at 10:02

## 2025-02-24 RX ADMIN — PROPOFOL 50 MCG/KG/MIN: 10 INJECTION, EMULSION INTRAVENOUS at 10:02

## 2025-02-24 RX ADMIN — ROCURONIUM BROMIDE 50 MG: 10 INJECTION, SOLUTION INTRAVENOUS at 10:02

## 2025-02-24 RX ADMIN — PHENYLEPHRINE HYDROCHLORIDE 100 MCG: 10 INJECTION INTRAVENOUS at 10:02

## 2025-02-24 RX ADMIN — FENTANYL CITRATE 50 MCG: 50 INJECTION, SOLUTION INTRAMUSCULAR; INTRAVENOUS at 10:02

## 2025-02-24 RX ADMIN — DEXAMETHASONE SODIUM PHOSPHATE 8 MG: 4 INJECTION, SOLUTION INTRA-ARTICULAR; INTRALESIONAL; INTRAMUSCULAR; INTRAVENOUS; SOFT TISSUE at 10:02

## 2025-02-24 RX ADMIN — IPRATROPIUM BROMIDE AND ALBUTEROL SULFATE 3 ML: .5; 3 SOLUTION RESPIRATORY (INHALATION) at 11:02

## 2025-02-24 RX ADMIN — LIDOCAINE HYDROCHLORIDE 100 MG: 20 INJECTION, SOLUTION EPIDURAL; INFILTRATION; INTRACAUDAL; PERINEURAL at 10:02

## 2025-02-24 RX ADMIN — PROPOFOL 150 MG: 10 INJECTION, EMULSION INTRAVENOUS at 10:02

## 2025-02-24 RX ADMIN — SODIUM CHLORIDE: 9 INJECTION, SOLUTION INTRAVENOUS at 10:02

## 2025-02-24 RX ADMIN — ONDANSETRON 4 MG: 2 INJECTION INTRAMUSCULAR; INTRAVENOUS at 10:02

## 2025-02-24 RX ADMIN — MIDAZOLAM HYDROCHLORIDE 2 MG: 1 INJECTION, SOLUTION INTRAMUSCULAR; INTRAVENOUS at 10:02

## 2025-02-24 NOTE — ANESTHESIA POSTPROCEDURE EVALUATION
Anesthesia Post Evaluation    Patient: Esperanza Wakefield    Procedure(s) Performed: * No procedures listed *    Final Anesthesia Type: general      Patient location during evaluation: PACU  Patient participation: Yes- Able to Participate  Level of consciousness: awake and sedated  Post-procedure vital signs: reviewed and stable  Pain management: adequate  Airway patency: patent    PONV status at discharge: No PONV  Anesthetic complications: no      Cardiovascular status: blood pressure returned to baseline  Respiratory status: unassisted  Hydration status: euvolemic  Follow-up not needed.              Vitals Value Taken Time   /57 02/24/25 12:45   Temp 36.7 °C (98 °F) 02/24/25 11:07   Pulse 89 02/24/25 12:45   Resp 18 02/24/25 12:45   SpO2 92 % 02/24/25 12:45         Event Time   Out of Recovery 11:25:00         Pain/Dulce Score: Dulce Score: 9 (2/24/2025 11:30 AM)

## 2025-02-24 NOTE — INTERVAL H&P NOTE
I have reviewed the History and Physical on file for Esperanza Wakefield and there is no significant change noted.  Procedure, including risks, discussed with the patient in detail and all questions were answered when I met the patient in the preprocedure area.  Consent was signed as per institutional protocol.  At this time, patient okay to proceed with planned bronchoscopy procedure.    The risks of bronchoscopy, and endobronchial ultrasound-guided biopsies +/- endobronchial and transbronchial biopsies were discussed with the patient in detail, including the alternatives to EBUS bronchoscopy.  Risks discussed included, but not limited to, bleeding, infection, pneumothorax, worsening shortness of breath and respiratory failure.  The patient/ their family verbalized understanding of this risk and agreed to proceed with the procedure as above.      Rapid on-site evaluation (JAMMIE) will be used during the case above, which we will provide a preliminary diagnosis.  I have offered to share this preliminary diagnosis with the patient/their family with their understanding that these results are preliminary and the final results me change.  The patient's/their family have opted to be made aware of these preliminary results.       Final pathology results will be subjected to a delayed release in the electronic medical record to the patient as per their request, so that the results can be interpreted by their physicians and explained to them in a satisfactory manner.  The patient is aware and has agreed to this.        Yury Tyson MD  Interventional Pulmonary and Critical Care  [facility]

## 2025-02-24 NOTE — BRIEF OP NOTE
Esperanza Wakefield  presents 02/24/2025 in outpatient setting for planned EBUS TBNA.    PROCEDURES: Bronchoscopy, Bronchoscopy with Endobronchial ultrasound (EBUS), Transbronchial needle aspiration of 1-2 lymph nodes or structures, Bronchoalveolar lavage, and Therapeutic aspiration  See procedure note for further details.    OUTCOME: Patient tolerated the procedure well without complication and is now ready for discharge    DISPOSITION: Home or self care    FINAL DIAGNOSIS:  Successful EBUS bronchoscopy with sampling of mass in station 4 L location with preliminary analysis indicative of malignancy. Additional samples sent into [facility] for flow cytometry. Status post bronchoalveolar lavage in left upper lobe. Final pathology analysis pending. Patient tolerated procedure well.     FOLLOW UP:  Preliminary results shared with patient, final results to be discussed during post procedural visit.    DISCHARGE INSTRUCTIONS: Post-bronchoscopy instructions discussed with patient. Entered into AVS.     TIME SPENT ON DISCHARGE: <30 minutes      Discussed with patient and accompanying family/friend management plans, all questions were answered and they verbalized understanding.       Yury Tyson MD  Interventional Pulmonary and Critical Care  [facility]

## 2025-02-24 NOTE — ANESTHESIA PROCEDURE NOTES
Intubation    Date/Time: 2/24/2025 10:05 AM    Performed by: Alicia Griggs CRNA  Authorized by: Javier Lyons MD    Intubation:     Induction:  Intravenous    Intubated:  Postinduction    Mask Ventilation:  Not attempted    Attempted By:  CRNA    Difficult Airway Encountered?: No      Complications:  None    Airway Device:  Supraglottic airway/LMA    Airway Device Size:  5.0    Placement Verified By:  Capnometry    Complicating Factors:  None    Findings Post-Intubation:  BS equal bilateral  Notes:      #5 iGel

## 2025-02-24 NOTE — OR NURSING
1055- Patient arrived in pacu. Vss, respirations even et unlabored.  No signs of pain or distress. Pt responds appropriately to verbal cues. Pt coughing. Drowsy.    1110 - Pt resting comfortably. Vss, respirations even et unlabored. Pt started on respiratory treatment. Pt denies pain.     1125- Pt ready for discharge per pacu criteria. Vss, respirations even et unlabored. Pt denies pain. Pt transported to [facility] by stretcher.    1130- Report given to Bertha Sparrow RN. VS: 105/58, HR 86, rr 18, O2 94% on room air. Family at bedside.

## 2025-02-24 NOTE — DISCHARGE INSTRUCTIONS
POST BRONCHOSCOPY DISCHARGE INSTRUCTIONS:  Today you have undergone a procedure called a bronchoscopy with biopsy (also referred to as Bronchoscopy with EBUS and biopsy). You underwent general anesthesia and the medication will be in your body for the following hours up to 24 hours. It is essential that you are accompanied home by a responsible adult and I recommend that they stay with you during this period. You should NOT drive a vehicle, operate any form of machinery, consume alcohol or sign legal documentation during this time. After 24 hours, the effect of sedation should have worn off and you will be able to start normal activities.      DIET: You may begin a normal diet and fluids 2 HOURS following leaving the hospital. This will make sure your swallowing muscles have recovered properly.      MEDICATIONS: You may resume your usual prescriptions.     BIOPSIES AND SPECIMENS: The biopsies that were taken following your procedure have been taken for processing and testing. It may take up to 1 week, and sometimes longer, to get the final result.  We will discuss additional results during your post procedural clinic visit.         SYMPTOMS:  You may have a sore throat after the procedure; this typically resolves in a day.  Because of the biopsies taken, you may experience a low grade fever for <24 hrs and coughing with some blood coming up.  You may cough after surgery. This is normal to clear secretions in your lung that collected during the time  you were asleep in surgery and could not cough on your own. You may also see some blood in your sputum.  This is normal and as long as it is only a small amount there is no need for alarm.      Awareness of the following unusual symptoms should prompt you to call our office at [phone] to discuss a plan for management. If the symptoms come on suddenly go to the Emergency Department for evaluation.  These unusual symptoms include:  Sudden breathing distress, such as  being more breathless than you normally are  Chest pain not responding to medication  Persistently high temperature or fever of 100.4°F or higher  Coughing up large amount of blood or blood clots (more than a teaspoon)   Sudden swelling of your previous IV sites           Yury Tyson MD  Interventional Pulmonary and Critical Care  [facility]

## 2025-02-24 NOTE — TRANSFER OF CARE
"Anesthesia Transfer of Care Note    Patient: Esperanza Wakefield    Procedure(s) Performed: * No procedures listed *    Patient location: PACU    Anesthesia Type: general    Transport from OR: Transported from OR on 100% O2 by closed face mask with adequate spontaneous ventilation    Post pain: adequate analgesia    Post assessment: no apparent anesthetic complications    Post vital signs: stable    Level of consciousness: responds to stimulation    Nausea/Vomiting: no nausea/vomiting    Complications: none    Transfer of care protocol was followed      Last vitals: Visit Vitals  BP (!) 97/43 (BP Location: Left arm, Patient Position: Lying)   Pulse 90   Temp 36.7 °C (98 °F) (Oral)   Resp (!) 24   Ht 5' 6" (1.676 m)   Wt 56.7 kg (125 lb)   SpO2 95%   Breastfeeding No   BMI 20.18 kg/m²     "

## 2025-02-24 NOTE — ANESTHESIA PREPROCEDURE EVALUATION
02/24/2025  Esperanza Wakefield is a 64 y.o., female.      Pre-op Assessment    I have reviewed the Patient Summary Reports.     I have reviewed the Nursing Notes. I have reviewed the NPO Status.   I have reviewed the Medications.     Review of Systems  Anesthesia Hx:  No problems with previous Anesthesia                Social:  Non-Smoker, No Alcohol Use       Hematology/Oncology:  Hematology Normal   Oncology Normal                                   EENT/Dental:  EENT/Dental Normal           Cardiovascular:     Hypertension           hyperlipidemia                               Pulmonary:      Shortness of breath                  Renal/:  Renal/ Normal                 Hepatic/GI:  Hepatic/GI Normal                    Musculoskeletal:  Musculoskeletal Normal                Neurological:  Neurology Normal                                      Endocrine:  Endocrine Normal            Dermatological:  Skin Normal    Psych:  Psychiatric Normal                    Physical Exam  General: Well nourished    Airway:  Mallampati: II / II  Mouth Opening: Normal  TM Distance: > 6 cm  Tongue: Normal  Neck ROM: Normal ROM    Chest/Lungs:  Clear to auscultation, Normal Respiratory Rate    Heart:  Rate: Normal  Rhythm: Regular Rhythm        Anesthesia Plan  Type of Anesthesia, risks & benefits discussed:    Anesthesia Type: Gen ETT  Intra-op Monitoring Plan: Standard ASA Monitors  Post Op Pain Control Plan: multimodal analgesia  Induction:  IV  Informed Consent: Informed consent signed with the Patient and all parties understand the risks and agree with anesthesia plan.  All questions answered. Patient consented to blood products? Yes  ASA Score: 2  Day of Surgery Review of History & Physical: H&P Update referred to the surgeon/provider.I have interviewed and examined the patient. I have reviewed the patient's H&P dated:     Ready  For Surgery From Anesthesia Perspective.     .

## 2025-02-25 LAB
ESTROGEN SERPL-MCNC: NORMAL PG/ML
INSULIN SERPL-ACNC: NORMAL U[IU]/ML
LAB AP CLINICAL INFORMATION: NORMAL
LAB AP COMMENTS: NORMAL
LAB AP GROSS DESCRIPTION: NORMAL
RUSH AP QUICK STAIN DIAGNOSIS: NORMAL
T3RU NFR SERPL: NORMAL %

## 2025-02-26 LAB — CULTURE, LOWER RESPIRATORY: NORMAL

## 2025-02-27 ENCOUNTER — OFFICE VISIT (OUTPATIENT)
Dept: PULMONOLOGY | Facility: CLINIC | Age: 65
End: 2025-02-27

## 2025-02-27 ENCOUNTER — TELEPHONE (OUTPATIENT)
Dept: PULMONOLOGY | Facility: CLINIC | Age: 65
End: 2025-02-27

## 2025-02-27 VITALS
HEART RATE: 89 BPM | BODY MASS INDEX: 19.84 KG/M2 | WEIGHT: 123.44 LBS | HEIGHT: 66 IN | DIASTOLIC BLOOD PRESSURE: 80 MMHG | RESPIRATION RATE: 16 BRPM | SYSTOLIC BLOOD PRESSURE: 120 MMHG | OXYGEN SATURATION: 96 %

## 2025-02-27 DIAGNOSIS — C34.90 SMALL CELL CARCINOMA OF LUNG, UNSPECIFIED LATERALITY, UNSPECIFIED PART OF LUNG: ICD-10-CM

## 2025-02-27 DIAGNOSIS — R06.09 DYSPNEA ON EXERTION: ICD-10-CM

## 2025-02-27 DIAGNOSIS — J44.9 CHRONIC OBSTRUCTIVE PULMONARY DISEASE, UNSPECIFIED COPD TYPE: Primary | ICD-10-CM

## 2025-02-27 LAB
M LLPT RESULT: NORMAL
PATH REPORT.FINAL DX SPEC: NORMAL
PATH REPORT.MICROSCOPIC SPEC OTHER STN: NORMAL
RELEVANT DIAGNOSTIC TESTS/LABORATORY DATA NOTE: NORMAL

## 2025-02-27 PROCEDURE — 99214 OFFICE O/P EST MOD 30 MIN: CPT | Mod: S$PBB,,, | Performed by: STUDENT IN AN ORGANIZED HEALTH CARE EDUCATION/TRAINING PROGRAM

## 2025-02-27 PROCEDURE — 99999 PR PBB SHADOW E&M-EST. PATIENT-LVL IV: CPT | Mod: PBBFAC,,, | Performed by: STUDENT IN AN ORGANIZED HEALTH CARE EDUCATION/TRAINING PROGRAM

## 2025-02-27 PROCEDURE — 99214 OFFICE O/P EST MOD 30 MIN: CPT | Mod: PBBFAC | Performed by: STUDENT IN AN ORGANIZED HEALTH CARE EDUCATION/TRAINING PROGRAM

## 2025-02-27 NOTE — TELEPHONE ENCOUNTER
----- Message from Jasmyne sent at 2/27/2025 12:02 PM CST -----  Who Called: Esperanza Carlos is requesting assistance/information from provider's office.Patient wants to know if she needs to come to her appt today since Dr Jenkins went over her biopsy results with her. Please call her back at the number below. ThanksPreferred Method of Contact: Phone CallPatient's Preferred Phone Number on File: [phone]

## 2025-02-27 NOTE — PROGRESS NOTES
Patient Name: Esperanza Wakefield   Primary Care Provider: Gildardo West MD   Date of Service: 02/27/2025   Reason for Referral:  Lung mass    Chief Complaint: Follow-up (Follow up/EBUS. States that this has been a lot to take in. Saw oncology this morning. Is requesting possible disabled parking placard and samples or Trelegy since prescription was over $700.)      Subjective:      Esperanza Wakefield is 64 y.o. female with past medical history significant for COPD, emphysema phenotype presents today after evaluation in the emergency department which showed evidence of lung mass on imaging.      Follow up clinic visit 2/27/25   She underwent bronchoscopy (EBUS bronchoscopy on 2/24/25).  Sampled station 4 L with final analysis indicative of small-cell cancer.  I reviewed her recent chest imaging which showed no evidence of pneumothorax.  She has been referred to Oncology and Radiation Oncology and staging scans are pending.  She presents today without any significant respiratory issues.  She has been treated with Trelegy for COPD (given samples at this time).  Return to clinic in about 4 weeks' time with the PFTs/6 minute walk test.  Saw oncology this morning, PET-CT scan in a few days with Oncology follow up and radiation oncology appointments has been set up.    Final Diagnosis  A. Lymph node, station 4 L, transbronchial needle aspiration:  - Small cell carcinoma (see comment)  - Lymphocytes present     B. Lymph node, station 4 L, transbronchial needle aspiration:  Referred for flow cytometry     C.  Lung, left upper lobe, bronchoalveolar lavage:  Negative for malignant cells    Initial clinic visit 2/13/25   I reviewed her recent CT chest which was done on 1/31/25 which showed large left hilar mass, in the station 4 L location along with bilateral pulmonary nodules.  I performed an ultrasound here in the clinic and could not appreciate any supraclavicular lymphadenopathy.  She had presented with hoarseness of voice which  started about 2-3 months ago, saw our ENT colleagues and then was referred over to pulmonary after her recent findings.  No hemoptysis, weight loss.  No other cardiac comorbidities.  I also reviewed her recent lab work and electrolytes were grossly within normal limits.  Scheduled for EBUS bronchoscopy at next immediately available appointment at which time we will perform TBNA, send samples for RPMI/flow cytometry, cultures and cytology/pathology analysis.  I have also moved her over to [facility] for triple inhaled therapy.        Assessment and Plan      Lung mass/mediastinal adenopathy-small-cell lung cancer  Multiple pulmonary nodules  COPD, emphysema phenotype        Assessment:  Review as above, found to have small-cell lung cancer.  Already set up with Oncology and we will see radiation oncology as well.        Plan  PET-CT scan/MRI brain for staging   PFTs and 6 minute walk test prior to next appointment  Continue Trelegy (given samples)   Counseled to call the clinic or go to the emergency department/call 911 in the event of worsening symptoms or any other red flag signs/symptoms as explained to the patient in detail              Follow-up   Follow-up  after PFTs/6 minute walk test as above    Yury Tyson MD  Interventional Pulmonary and Critical Care  [facility]          Problem List Items Addressed This Visit    None            Past Medical History:   Diagnosis Date    Hyperlipidemia     Hypertension     SOB (shortness of breath)     Wheezing       Past Surgical History:   Procedure Laterality Date    HERNIA REPAIR      SINUS SURGERY      TUBAL LIGATION      TUMOR REMOVAL       Family History   Problem Relation Name Age of Onset    Lung cancer Mother      Hypertension Mother      Hypertension Father      Diabetes Father      Hypertension Sister      Diabetes Sister      Hypertension Brother      Diabetes Brother       Review of patient's allergies indicates:  No Known Allergies   Social  "History     Tobacco Use    Smoking status: Former     Current packs/day: 0.00     Average packs/day: 0.5 packs/day for 40.0 years (20.0 ttl pk-yrs)     Types: Cigarettes     Quit date: 2025     Years since quittin.0     Passive exposure: Current    Smokeless tobacco: Never   Substance Use Topics    Alcohol use: Not Currently    Drug use: Never      Review of Systems       Objective:      Physical Exam           2025     1:49 PM 2025    12:45 PM 2025    12:30 PM 2025    12:15 PM 2025    12:00 PM 2025    11:45 AM 2025    11:30 AM   Pulmonary Function Tests   SpO2 96 % 92 % 92 % 92 % 92 % 95 % 94 %   Height 5' 6" (1.676 m)         Weight 56 kg (123 lb 7.3 oz)         BMI (Calculated) 19.9               Outpatient Encounter Medications as of 2025   Medication Sig Dispense Refill    albuterol (PROVENTIL/VENTOLIN HFA) 90 mcg/actuation inhaler Inhale 2 puffs into the lungs every 6 (six) hours as needed for Wheezing. Rescue 18 g 3    buPROPion (WELLBUTRIN SR) 150 MG TBSR 12 hr tablet Take 1 p.o. q.h.s. 30 tablet 2    fluticasone propionate (FLONASE) 50 mcg/actuation nasal spray 2 sprays (100 mcg total) by Each Nostril route once daily. 16 g 11    lovastatin (MEVACOR) 40 MG tablet Take 1 tablet (40 mg total) by mouth every evening. 90 tablet 1    fluticasone-umeclidin-vilanter (TRELEGY ELLIPTA) 200-62.5-25 mcg inhaler Inhale 1 puff into the lungs once daily. (Patient not taking: Reported on 2025) 28 each 11    [DISCONTINUED] diphenhydrAMINE injection 25 mg       [DISCONTINUED] HYDROmorphone (PF) injection 0.5 mg       [DISCONTINUED] lactated ringers infusion       [DISCONTINUED] morphine injection 4 mg       [DISCONTINUED] ondansetron injection 4 mg       [DISCONTINUED] oxyCODONE immediate release tablet 5 mg        No facility-administered encounter medications on file as of 2025.       Assessment & Plan    As above                                          No orders " of the defined types were placed in this encounter.

## 2025-03-11 ENCOUNTER — RESULTS FOLLOW-UP (OUTPATIENT)
Dept: GASTROENTEROLOGY | Facility: HOSPITAL | Age: 65
End: 2025-03-11

## 2025-03-27 DIAGNOSIS — J44.9 CHRONIC OBSTRUCTIVE PULMONARY DISEASE, UNSPECIFIED COPD TYPE: Primary | ICD-10-CM

## 2025-03-27 NOTE — TELEPHONE ENCOUNTER
----- Message from Joan sent at 3/27/2025  3:35 PM CDT -----  Regarding: RX Refill  Who Called: Esperanza Stroud is asking if the Trelegy prescription can be called in again because her insurance will cover it [id number] - [place], [place] - [address] [facility] [address] [id number] [place] [address]Jxwxd: [phone] Fax: [phone]: Not open 24 hoursPatient's Preferred Phone Number on File: [phone]

## 2025-03-28 RX ORDER — FLUTICASONE FUROATE, UMECLIDINIUM BROMIDE AND VILANTEROL TRIFENATATE 200; 62.5; 25 UG/1; UG/1; UG/1
1 POWDER RESPIRATORY (INHALATION) DAILY
Qty: 28 EACH | Refills: 11 | Status: SHIPPED | OUTPATIENT
Start: 2025-03-28

## 2025-04-08 ENCOUNTER — OFFICE VISIT (OUTPATIENT)
Dept: PULMONOLOGY | Facility: CLINIC | Age: 65
End: 2025-04-08
Payer: COMMERCIAL

## 2025-04-08 ENCOUNTER — CLINICAL SUPPORT (OUTPATIENT)
Dept: PULMONOLOGY | Facility: HOSPITAL | Age: 65
End: 2025-04-08
Attending: STUDENT IN AN ORGANIZED HEALTH CARE EDUCATION/TRAINING PROGRAM
Payer: COMMERCIAL

## 2025-04-08 VITALS
OXYGEN SATURATION: 97 % | BODY MASS INDEX: 19.13 KG/M2 | WEIGHT: 119.06 LBS | HEART RATE: 85 BPM | SYSTOLIC BLOOD PRESSURE: 100 MMHG | BODY MASS INDEX: 19.29 KG/M2 | WEIGHT: 120 LBS | RESPIRATION RATE: 16 BRPM | DIASTOLIC BLOOD PRESSURE: 74 MMHG | OXYGEN SATURATION: 96 % | HEIGHT: 66 IN | HEIGHT: 66 IN

## 2025-04-08 DIAGNOSIS — C79.31 LUNG CANCER METASTATIC TO BRAIN: ICD-10-CM

## 2025-04-08 DIAGNOSIS — R59.0 MEDIASTINAL ADENOPATHY: ICD-10-CM

## 2025-04-08 DIAGNOSIS — C34.90 SMALL CELL CARCINOMA OF LUNG, UNSPECIFIED LATERALITY, UNSPECIFIED PART OF LUNG: ICD-10-CM

## 2025-04-08 DIAGNOSIS — J44.9 CHRONIC OBSTRUCTIVE PULMONARY DISEASE, UNSPECIFIED COPD TYPE: ICD-10-CM

## 2025-04-08 DIAGNOSIS — R91.8 MASS OF HILUM: Primary | ICD-10-CM

## 2025-04-08 DIAGNOSIS — R06.09 DYSPNEA ON EXERTION: ICD-10-CM

## 2025-04-08 DIAGNOSIS — C34.90 LUNG CANCER METASTATIC TO BRAIN: ICD-10-CM

## 2025-04-08 PROCEDURE — 1159F MED LIST DOCD IN RCRD: CPT | Mod: CPTII,,, | Performed by: STUDENT IN AN ORGANIZED HEALTH CARE EDUCATION/TRAINING PROGRAM

## 2025-04-08 PROCEDURE — 3078F DIAST BP <80 MM HG: CPT | Mod: CPTII,,, | Performed by: STUDENT IN AN ORGANIZED HEALTH CARE EDUCATION/TRAINING PROGRAM

## 2025-04-08 PROCEDURE — 94729 DIFFUSING CAPACITY: CPT

## 2025-04-08 PROCEDURE — 94060 EVALUATION OF WHEEZING: CPT

## 2025-04-08 PROCEDURE — 3074F SYST BP LT 130 MM HG: CPT | Mod: CPTII,,, | Performed by: STUDENT IN AN ORGANIZED HEALTH CARE EDUCATION/TRAINING PROGRAM

## 2025-04-08 PROCEDURE — 94618 PULMONARY STRESS TESTING: CPT

## 2025-04-08 PROCEDURE — 94726 PLETHYSMOGRAPHY LUNG VOLUMES: CPT

## 2025-04-08 PROCEDURE — 99214 OFFICE O/P EST MOD 30 MIN: CPT | Mod: PBBFAC,25 | Performed by: STUDENT IN AN ORGANIZED HEALTH CARE EDUCATION/TRAINING PROGRAM

## 2025-04-08 PROCEDURE — 3008F BODY MASS INDEX DOCD: CPT | Mod: CPTII,,, | Performed by: STUDENT IN AN ORGANIZED HEALTH CARE EDUCATION/TRAINING PROGRAM

## 2025-04-08 PROCEDURE — 99999 PR PBB SHADOW E&M-EST. PATIENT-LVL IV: CPT | Mod: PBBFAC,,, | Performed by: STUDENT IN AN ORGANIZED HEALTH CARE EDUCATION/TRAINING PROGRAM

## 2025-04-08 PROCEDURE — 99214 OFFICE O/P EST MOD 30 MIN: CPT | Mod: 25,S$PBB,, | Performed by: STUDENT IN AN ORGANIZED HEALTH CARE EDUCATION/TRAINING PROGRAM

## 2025-04-08 PROCEDURE — 27100098 HC SPACER

## 2025-04-08 RX ORDER — ONDANSETRON 4 MG/1
4-8 TABLET, FILM COATED ORAL EVERY 6 HOURS PRN
COMMUNITY
Start: 2025-03-06

## 2025-04-08 NOTE — PROGRESS NOTES
Patient Name: Esperanza Wakefield   Primary Care Provider: Gildardo West MD   Date of Service: 05/11/2025   Reason for Referral:  Lung mass    Chief Complaint: COPD (4wk follow up/PFT/6MWT.)      Subjective:      Esperanza Wakefield is 64 y.o. female with past medical history significant for COPD, emphysema phenotype presents today after evaluation in the emergency department which showed evidence of lung mass on imaging.        Follow up clinic visit 4/8/25  The patient an appointment with the Radiation Oncology Dr. Justice Jovel.  MRI brain with suspicious area of enhancement in the right cerebellum without any focal neurological features.  She has a history of small-cell carcinoma.  Severely decreased DLCO on pulmonary function testing.  Continues on Trelegy, follow up in 4 months with a chest x-ray prior.    Follow up clinic visit 2/27/25   She underwent bronchoscopy (EBUS bronchoscopy on 2/24/25).  Sampled station 4 L with final analysis indicative of small-cell cancer.  I reviewed her recent chest imaging which showed no evidence of pneumothorax.  She has been referred to Oncology and Radiation Oncology and staging scans are pending.  She presents today without any significant respiratory issues.  She has been treated with Trelegy for COPD (given samples at this time).  Return to clinic in about 4 weeks' time with the PFTs/6 minute walk test.  Saw oncology this morning, PET-CT scan in a few days with Oncology follow up and radiation oncology appointments has been set up.    Final Diagnosis  A. Lymph node, station 4 L, transbronchial needle aspiration:  - Small cell carcinoma (see comment)  - Lymphocytes present     B. Lymph node, station 4 L, transbronchial needle aspiration:  Referred for flow cytometry     C.  Lung, left upper lobe, bronchoalveolar lavage:  Negative for malignant cells    Initial clinic visit 2/13/25   I reviewed her recent CT chest which was done on 1/31/25 which showed large left hilar mass, in the  station 4 L location along with bilateral pulmonary nodules.  I performed an ultrasound here in the clinic and could not appreciate any supraclavicular lymphadenopathy.  She had presented with hoarseness of voice which started about 2-3 months ago, saw our ENT colleagues and then was referred over to pulmonary after her recent findings.  No hemoptysis, weight loss.  No other cardiac comorbidities.  I also reviewed her recent lab work and electrolytes were grossly within normal limits.  Scheduled for EBUS bronchoscopy at next immediately available appointment at which time we will perform TBNA, send samples for RPMI/flow cytometry, cultures and cytology/pathology analysis.  I have also moved her over to [facility] for triple inhaled therapy.        Assessment and Plan      Lung mass/mediastinal adenopathy-small-cell lung cancer  Multiple pulmonary nodules  COPD, emphysema phenotype  Brain metastases        Assessment:  Review as above, found to have small-cell lung cancer.  Already set up with Oncology and we will see radiation oncology as well.  Radiation oncology monitoring with repeat MRI in 2 months' time.  Continues on Trelegy inhaler.      Plan  Continue Trelegy   Continue with Radiation Oncology and Oncology   Return to clinic with chest x-ray prior   Counseled to call the clinic or go to the emergency department/call 911 in the event of worsening symptoms or any other red flag signs/symptoms as explained to the patient in detail                Yury Tyson MD  Interventional Pulmonary and Critical Care  [facility]          Problem List Items Addressed This Visit    None  Visit Diagnoses         Mass of hilum    -  Primary    Relevant Orders    X-Ray Chest PA And Lateral                  Past Medical History:   Diagnosis Date    Hyperlipidemia     Hypertension     SOB (shortness of breath)     Wheezing       Past Surgical History:   Procedure Laterality Date    HERNIA REPAIR      SINUS SURGERY       TUBAL LIGATION      TUMOR REMOVAL       Family History   Problem Relation Name Age of Onset    Lung cancer Mother      Hypertension Mother      Hypertension Father      Diabetes Father      Hypertension Sister      Diabetes Sister      Hypertension Brother      Diabetes Brother       Review of patient's allergies indicates:  No Known Allergies   Social History     Tobacco Use    Smoking status: Former     Current packs/day: 0.00     Average packs/day: 0.5 packs/day for 40.0 years (20.0 ttl pk-yrs)     Types: Cigarettes     Quit date: 2025     Years since quittin.2     Passive exposure: Current    Smokeless tobacco: Never   Substance Use Topics    Alcohol use: Not Currently    Drug use: Never      Review of Systems       Objective:      Physical Exam  Constitutional:       General: She is not in acute distress.     Appearance: Normal appearance. She is normal weight. She is not ill-appearing or diaphoretic.   HENT:      Head: Normocephalic and atraumatic.      Nose: No congestion or rhinorrhea.      Mouth/Throat:      Mouth: Mucous membranes are moist.   Cardiovascular:      Rate and Rhythm: Normal rate and regular rhythm.      Pulses: Normal pulses.      Heart sounds: Normal heart sounds.   Pulmonary:      Effort: Pulmonary effort is normal. No respiratory distress.      Breath sounds: No stridor. Wheezing present. No rhonchi or rales.      Comments: Bilateral expiratory wheezing present  Chest:      Chest wall: No tenderness.   Abdominal:      General: Abdomen is flat.   Musculoskeletal:      Cervical back: Normal range of motion. No rigidity.      Right lower leg: No edema.      Left lower leg: No edema.   Skin:     General: Skin is warm.      Findings: No erythema.   Neurological:      General: No focal deficit present.      Mental Status: She is alert and oriented to person, place, and time. Mental status is at baseline.   Psychiatric:         Mood and Affect: Mood normal.         Behavior: Behavior  "normal.         Thought Content: Thought content normal.                4/8/2025     2:44 PM 4/8/2025     1:00 PM 4/8/2025    12:50 PM 2/27/2025     1:49 PM 2/24/2025    12:45 PM 2/24/2025    12:30 PM 2/24/2025    12:15 PM   Pulmonary Function Tests   FVC  2.78 liters        FVC%  84        FEV1  1.5 liters        FEV1%  58        FEF 25-75  66        FEF 25-75%  30        TLC (liters)  6.96 liters        TLC%  129        RV  4.13        RV%  190        DLCO (ml/mmHg sec)  4.01 ml/mmHg sec        DLCO%  19        Peak Flow  170 L/min        FiO2 (%)  21 %        SpO2 97 % 96 %  96 % 92 % 92 % 92 %   Ordering Provider   Yury Tyson MD       Performing nurse/tech/RT   Priscila Segura CRT       Diagnosis   Shortness of Breath       Height 5' 6" (1.676 m)  5' 6" (1.676 m) 5' 6" (1.676 m)      Weight 54 kg (119 lb 0.8 oz)  54.4 kg (120 lb) 56 kg (123 lb 7.3 oz)      BMI (Calculated) 19.2  19.4 19.9      6MWT Status   completed without stopping       Patient Reported   No complaints       Was O2 used?   No       6MW Distance walked (feet)   1461 feet       Distance walked (meters)   445.31 meters       Did patient stop?   No       Type of assistive device(s) used?   no assistive devices       Oxygen Saturation   98 %       Supplemental Oxygen   Room Air       Heart Rate   93 bpm       Blood Pressure   111/75       Rosanne Dyspnea Rating    very light       Oxygen Saturation   94 %       Supplemental Oxygen   Room Air       Heart Rate   102 bpm       Blood Pressure   117/81       Rosanne Dyspnea Rating    moderate       Recovery Time (seconds)   60 seconds       Oxygen Saturation   97 %       Supplemental Oxygen   Room Air       Heart Rate   94 bpm       Blood Pressure   124/75       Rosanne Dyspnea Rating    very light             Outpatient Encounter Medications as of 4/8/2025   Medication Sig Dispense Refill    albuterol (PROVENTIL/VENTOLIN HFA) 90 mcg/actuation inhaler Inhale 2 puffs into the lungs every 6 (six) hours as needed " for Wheezing. Rescue 18 g 3    buPROPion (WELLBUTRIN SR) 150 MG TBSR 12 hr tablet Take 1 p.o. q.h.s. 30 tablet 2    fluticasone-umeclidin-vilanter (TRELEGY ELLIPTA) 200-62.5-25 mcg inhaler Inhale 1 puff into the lungs once daily. 28 each 11    ondansetron (ZOFRAN) 4 MG tablet Take 4-8 mg by mouth every 6 (six) hours as needed.      [DISCONTINUED] fluticasone propionate (FLONASE) 50 mcg/actuation nasal spray 2 sprays (100 mcg total) by Each Nostril route once daily. 16 g 11    [DISCONTINUED] lovastatin (MEVACOR) 40 MG tablet Take 1 tablet (40 mg total) by mouth every evening. 90 tablet 1    [DISCONTINUED] fluticasone-umeclidin-vilanter (TRELEGY ELLIPTA) 200-62.5-25 mcg inhaler Inhale 1 puff into the lungs once daily. (Patient not taking: Reported on 2/27/2025) 28 each 11     No facility-administered encounter medications on file as of 4/8/2025.       Assessment & Plan    As above                                          Orders Placed This Encounter   Procedures    X-Ray Chest PA And Lateral     Standing Status:   Future     Expected Date:   10/8/2025     Expiration Date:   4/8/2026     May the Radiologist modify the order per protocol to meet the clinical needs of the patient?:   Yes     Release to patient:   Immediate

## 2025-04-22 DIAGNOSIS — J30.9 ALLERGIC RHINITIS, UNSPECIFIED SEASONALITY, UNSPECIFIED TRIGGER: ICD-10-CM

## 2025-04-22 DIAGNOSIS — E78.49 OTHER HYPERLIPIDEMIA: ICD-10-CM

## 2025-04-22 RX ORDER — FLUTICASONE PROPIONATE 50 MCG
2 SPRAY, SUSPENSION (ML) NASAL DAILY
Qty: 16 G | Refills: 11 | Status: SHIPPED | OUTPATIENT
Start: 2025-04-22

## 2025-04-22 RX ORDER — LOVASTATIN 40 MG/1
40 TABLET ORAL NIGHTLY
Qty: 90 TABLET | Refills: 1 | Status: SHIPPED | OUTPATIENT
Start: 2025-04-22 | End: 2025-10-19

## 2025-04-22 NOTE — TELEPHONE ENCOUNTER
----- Message from Ilana sent at 4/22/2025  1:39 PM CDT -----  Regarding: Med Refill  Contact: Patient  Pt needs: lovastatin (MEVACOR) 40 MG tablet and fluticasone propionate (FLONASE) 50 mcg/actuation nasal sprayPharmacy:[id number] - [place], [place] - [address] [facility] [address] #: [phone] (M)

## 2025-05-28 DIAGNOSIS — R05.3 CHRONIC COUGH: ICD-10-CM

## 2025-05-28 RX ORDER — ALBUTEROL SULFATE 90 UG/1
2 INHALANT RESPIRATORY (INHALATION) EVERY 6 HOURS PRN
Qty: 18 G | Refills: 3 | Status: SHIPPED | OUTPATIENT
Start: 2025-05-28

## 2025-05-28 NOTE — TELEPHONE ENCOUNTER
Copied from CRM #[id number]. Topic: Medications - Medication Refill  >> May 28, 2025 11:50 AM Jasmyne wrote:  Who Called: Esperanza Wakefield    Refill or New Rx:Refill  RX Name and Strength:albuterol (PROVENTIL/VENTOLIN HFA) 90 mcg/actuation   How is the patient currently taking it? (ex. 1XDay): Inhale 2 puffs into the lungs every 6 (six) hours as needed for Wheezing. Rescue - Inhalation   Is this a 30 day or 90 day RX:n/a  Local or Mail Order[id number] [place] [facility] [address] AT [facility] [address]      Preferred Method of Contact: Phone Call  Patient's Preferred Phone Number on File: [phone]